# Patient Record
Sex: FEMALE | Race: ASIAN | NOT HISPANIC OR LATINO | Employment: OTHER | URBAN - METROPOLITAN AREA
[De-identification: names, ages, dates, MRNs, and addresses within clinical notes are randomized per-mention and may not be internally consistent; named-entity substitution may affect disease eponyms.]

---

## 2021-09-20 ENCOUNTER — HISTORICAL (OUTPATIENT)
Dept: LAB | Facility: HOSPITAL | Age: 81
End: 2021-09-20

## 2021-09-20 LAB
BUN SERPL-MCNC: 24.5 MG/DL (ref 9.8–20.1)
CALCIUM SERPL-MCNC: 9.3 MG/DL (ref 8.4–10.2)
CHLORIDE SERPL-SCNC: 107 MMOL/L (ref 98–107)
CO2 SERPL-SCNC: 26 MMOL/L (ref 23–31)
CREAT SERPL-MCNC: 1.12 MG/DL (ref 0.57–1.11)
CREAT/UREA NIT SERPL: 22
EST. AVERAGE GLUCOSE BLD GHB EST-MCNC: 128.4 MG/DL
GLUCOSE SERPL-MCNC: 149 MG/DL (ref 82–115)
HBA1C MFR BLD: 6.1 %
POTASSIUM SERPL-SCNC: 4.3 MMOL/L (ref 3.5–5.1)
SODIUM SERPL-SCNC: 143 MMOL/L (ref 136–145)

## 2021-09-28 ENCOUNTER — HISTORICAL (OUTPATIENT)
Dept: LAB | Facility: HOSPITAL | Age: 81
End: 2021-09-28

## 2021-09-28 LAB
BUN SERPL-MCNC: 26.5 MG/DL (ref 9.8–20.1)
CALCIUM SERPL-MCNC: 9.7 MG/DL (ref 8.4–10.2)
CHLORIDE SERPL-SCNC: 106 MMOL/L (ref 98–107)
CO2 SERPL-SCNC: 26 MMOL/L (ref 23–31)
CREAT SERPL-MCNC: 1.09 MG/DL (ref 0.57–1.11)
CREAT/UREA NIT SERPL: 24
GLUCOSE SERPL-MCNC: 123 MG/DL (ref 82–115)
POTASSIUM SERPL-SCNC: 4 MMOL/L (ref 3.5–5.1)
SODIUM SERPL-SCNC: 143 MMOL/L (ref 136–145)

## 2022-02-10 ENCOUNTER — HISTORICAL (OUTPATIENT)
Dept: LAB | Facility: HOSPITAL | Age: 82
End: 2022-02-10

## 2022-02-10 LAB
BUN SERPL-MCNC: 29.6 MG/DL (ref 9.8–20.1)
CALCIUM SERPL-MCNC: 9.1 MG/DL (ref 8.4–10.2)
CHLORIDE SERPL-SCNC: 108 MMOL/L (ref 98–107)
CHOLEST SERPL-MCNC: 158 MG/DL
CHOLEST/HDLC SERPL: 3 {RATIO} (ref 0–5)
CO2 SERPL-SCNC: 28 MMOL/L (ref 23–31)
CREAT SERPL-MCNC: 0.93 MG/DL (ref 0.57–1.11)
CREAT/UREA NIT SERPL: 32
EST. AVERAGE GLUCOSE BLD GHB EST-MCNC: 134.1 MG/DL
GLUCOSE SERPL-MCNC: 155 MG/DL (ref 82–115)
HBA1C MFR BLD: 6.3 %
HDLC SERPL-MCNC: 53 MG/DL (ref 40–60)
HEMOLYSIS INTERF INDEX SERPL-ACNC: 4
ICTERIC INTERF INDEX SERPL-ACNC: 0
LDLC SERPL CALC-MCNC: 89 MG/DL (ref 50–140)
LIPEMIC INTERF INDEX SERPL-ACNC: -3
POTASSIUM SERPL-SCNC: 4.5 MMOL/L (ref 3.5–5.1)
SODIUM SERPL-SCNC: 143 MMOL/L (ref 136–145)
TRIGL SERPL-MCNC: 82 MG/DL (ref 0–150)
VLDLC SERPL CALC-MCNC: 16 MG/DL

## 2022-03-07 ENCOUNTER — HISTORICAL (OUTPATIENT)
Dept: LAB | Facility: HOSPITAL | Age: 82
End: 2022-03-07

## 2022-03-07 LAB
EST. AVERAGE GLUCOSE BLD GHB EST-MCNC: 131.2 MG/DL
HBA1C MFR BLD: 6.2 %

## 2022-04-11 ENCOUNTER — HISTORICAL (OUTPATIENT)
Dept: ADMINISTRATIVE | Facility: HOSPITAL | Age: 82
End: 2022-04-11
Payer: MEDICARE

## 2022-04-24 VITALS
OXYGEN SATURATION: 98 % | HEIGHT: 56 IN | SYSTOLIC BLOOD PRESSURE: 130 MMHG | WEIGHT: 106.25 LBS | BODY MASS INDEX: 23.9 KG/M2 | DIASTOLIC BLOOD PRESSURE: 68 MMHG

## 2022-05-16 RX ORDER — DOXAZOSIN 4 MG/1
4 TABLET ORAL DAILY
COMMUNITY
Start: 2022-04-18 | End: 2022-05-16 | Stop reason: SDUPTHER

## 2022-05-16 RX ORDER — DOXAZOSIN 4 MG/1
4 TABLET ORAL DAILY
Qty: 90 TABLET | Refills: 0 | Status: SHIPPED | OUTPATIENT
Start: 2022-05-16 | End: 2022-08-10 | Stop reason: SDUPTHER

## 2022-05-16 NOTE — TELEPHONE ENCOUNTER
----- Message from Janae Brothers sent at 5/16/2022 10:25 AM CDT -----  Regarding: refill, Dr Philip  Patient daughter came in to request refill on patient for doxazosin.

## 2022-07-18 ENCOUNTER — TELEPHONE (OUTPATIENT)
Dept: FAMILY MEDICINE | Facility: CLINIC | Age: 82
End: 2022-07-18

## 2022-07-18 DIAGNOSIS — E11.9 NON-INSULIN DEPENDENT TYPE 2 DIABETES MELLITUS: Primary | ICD-10-CM

## 2022-07-18 DIAGNOSIS — I10 PRIMARY HYPERTENSION: ICD-10-CM

## 2022-07-19 NOTE — TELEPHONE ENCOUNTER
Pts family notified that the orders have been place for the labs and that pt can have them done prior to the next appt. Verbal understanding given

## 2022-07-19 NOTE — TELEPHONE ENCOUNTER
I have ordered the following labs. Please notify the patient.    Orders Placed This Encounter   Procedures    Hemoglobin A1C     Standing Status:   Future     Standing Expiration Date:   9/17/2023    Microalbumin/Creatinine Ratio, Urine     Standing Status:   Future     Standing Expiration Date:   9/17/2023     Order Specific Question:   Specimen Source     Answer:   Urine    Basic Metabolic Panel     Standing Status:   Future     Standing Expiration Date:   9/17/2023

## 2022-07-25 ENCOUNTER — LAB VISIT (OUTPATIENT)
Dept: LAB | Facility: HOSPITAL | Age: 82
End: 2022-07-25
Attending: STUDENT IN AN ORGANIZED HEALTH CARE EDUCATION/TRAINING PROGRAM
Payer: MEDICARE

## 2022-07-25 DIAGNOSIS — I10 PRIMARY HYPERTENSION: ICD-10-CM

## 2022-07-25 DIAGNOSIS — E11.9 NON-INSULIN DEPENDENT TYPE 2 DIABETES MELLITUS: ICD-10-CM

## 2022-07-25 LAB
ANION GAP SERPL CALC-SCNC: 10 MEQ/L
BUN SERPL-MCNC: 29.4 MG/DL (ref 9.8–20.1)
CALCIUM SERPL-MCNC: 9.9 MG/DL (ref 8.4–10.2)
CHLORIDE SERPL-SCNC: 108 MMOL/L (ref 98–107)
CO2 SERPL-SCNC: 24 MMOL/L (ref 23–31)
CREAT SERPL-MCNC: 1 MG/DL (ref 0.55–1.02)
CREAT UR-MCNC: 177.1 MG/DL (ref 47–110)
CREAT/UREA NIT SERPL: 29
EST. AVERAGE GLUCOSE BLD GHB EST-MCNC: 125.5 MG/DL
GLUCOSE SERPL-MCNC: 147 MG/DL (ref 82–115)
HBA1C MFR BLD: 6 %
MICROALBUMIN UR-MCNC: 521.8 UG/ML
MICROALBUMIN/CREAT RATIO PNL UR: 294.6 MG/GM CR (ref 0–30)
POTASSIUM SERPL-SCNC: 4.1 MMOL/L (ref 3.5–5.1)
SODIUM SERPL-SCNC: 142 MMOL/L (ref 136–145)

## 2022-07-25 PROCEDURE — 36415 COLL VENOUS BLD VENIPUNCTURE: CPT

## 2022-07-25 PROCEDURE — 82043 UR ALBUMIN QUANTITATIVE: CPT

## 2022-07-25 PROCEDURE — 80048 BASIC METABOLIC PNL TOTAL CA: CPT

## 2022-07-25 PROCEDURE — 83036 HEMOGLOBIN GLYCOSYLATED A1C: CPT

## 2022-07-26 ENCOUNTER — TELEPHONE (OUTPATIENT)
Dept: FAMILY MEDICINE | Facility: CLINIC | Age: 82
End: 2022-07-26
Payer: MEDICARE

## 2022-07-26 NOTE — TELEPHONE ENCOUNTER
----- Message from GISSEL Monzon sent at 7/25/2022  4:13 PM CDT -----  Labs reviewed; No acute/urgent findings; will be discussed at upcoming appt with Dr. Philip

## 2022-08-03 ENCOUNTER — HOSPITAL ENCOUNTER (EMERGENCY)
Facility: HOSPITAL | Age: 82
Discharge: HOME | End: 2022-08-03
Attending: INTERNAL MEDICINE
Payer: MEDICARE

## 2022-08-03 VITALS
SYSTOLIC BLOOD PRESSURE: 159 MMHG | RESPIRATION RATE: 16 BRPM | HEIGHT: 58 IN | BODY MASS INDEX: 23.09 KG/M2 | WEIGHT: 110 LBS | OXYGEN SATURATION: 97 % | HEART RATE: 56 BPM | TEMPERATURE: 98 F | DIASTOLIC BLOOD PRESSURE: 72 MMHG

## 2022-08-03 DIAGNOSIS — L30.9 DERMATITIS: ICD-10-CM

## 2022-08-03 DIAGNOSIS — R07.9 CHEST PAIN: ICD-10-CM

## 2022-08-03 DIAGNOSIS — R04.2 COUGH WITH HEMOPTYSIS: Primary | ICD-10-CM

## 2022-08-03 DIAGNOSIS — D64.9 ANEMIA, UNSPECIFIED TYPE: ICD-10-CM

## 2022-08-03 LAB
ALBUMIN SERPL-MCNC: 3.9 GM/DL (ref 3.4–4.8)
ALBUMIN/GLOB SERPL: 1.2 RATIO (ref 1.1–2)
ALP SERPL-CCNC: 74 UNIT/L (ref 40–150)
ALT SERPL-CCNC: 21 UNIT/L (ref 0–55)
APTT PPP: 27.8 SECONDS (ref 23.4–33.9)
AST SERPL-CCNC: 22 UNIT/L (ref 5–34)
BASOPHILS # BLD AUTO: 0.03 X10(3)/MCL (ref 0–0.2)
BASOPHILS NFR BLD AUTO: 0.8 %
BILIRUBIN DIRECT+TOT PNL SERPL-MCNC: 0.5 MG/DL
BNP BLD-MCNC: 75.8 PG/ML
BUN SERPL-MCNC: 23.5 MG/DL (ref 9.8–20.1)
CALCIUM SERPL-MCNC: 9.5 MG/DL (ref 8.4–10.2)
CHLORIDE SERPL-SCNC: 110 MMOL/L (ref 98–107)
CO2 SERPL-SCNC: 26 MMOL/L (ref 23–31)
CREAT SERPL-MCNC: 0.98 MG/DL (ref 0.55–1.02)
EOSINOPHIL # BLD AUTO: 0.32 X10(3)/MCL (ref 0–0.9)
EOSINOPHIL NFR BLD AUTO: 8.2 %
ERYTHROCYTE [DISTWIDTH] IN BLOOD BY AUTOMATED COUNT: 13.4 % (ref 11.5–17)
GLOBULIN SER-MCNC: 3.3 GM/DL (ref 2.4–3.5)
GLUCOSE SERPL-MCNC: 154 MG/DL (ref 82–115)
HCT VFR BLD AUTO: 33.2 % (ref 37–47)
HGB BLD-MCNC: 11 GM/DL (ref 12–16)
IMM GRANULOCYTES # BLD AUTO: 0 X10(3)/MCL (ref 0–0.04)
IMM GRANULOCYTES NFR BLD AUTO: 0 %
INR BLD: 1.06 (ref 2–3)
LYMPHOCYTES # BLD AUTO: 1.03 X10(3)/MCL (ref 0.6–4.6)
LYMPHOCYTES NFR BLD AUTO: 26.5 %
MCH RBC QN AUTO: 31.1 PG (ref 27–31)
MCHC RBC AUTO-ENTMCNC: 33.1 MG/DL (ref 33–36)
MCV RBC AUTO: 93.8 FL (ref 80–94)
MONOCYTES # BLD AUTO: 0.27 X10(3)/MCL (ref 0.1–1.3)
MONOCYTES NFR BLD AUTO: 7 %
NEUTROPHILS # BLD AUTO: 2.2 X10(3)/MCL (ref 2.1–9.2)
NEUTROPHILS NFR BLD AUTO: 57.5 %
NRBC BLD AUTO-RTO: 0 %
PLATELET # BLD AUTO: 193 X10(3)/MCL (ref 130–400)
PMV BLD AUTO: 9.9 FL (ref 7.4–10.4)
POTASSIUM SERPL-SCNC: 4 MMOL/L (ref 3.5–5.1)
PROT SERPL-MCNC: 7.2 GM/DL (ref 5.8–7.6)
PROTHROMBIN TIME: 13.6 SECONDS (ref 11.7–14.5)
RBC # BLD AUTO: 3.54 X10(6)/MCL (ref 4.2–5.4)
SARS-COV-2 RDRP RESP QL NAA+PROBE: NEGATIVE
SODIUM SERPL-SCNC: 146 MMOL/L (ref 136–145)
TROPONIN I SERPL-MCNC: <0.01 NG/ML (ref 0–0.04)
WBC # SPEC AUTO: 3.9 X10(3)/MCL (ref 4.5–11.5)

## 2022-08-03 PROCEDURE — 63600175 PHARM REV CODE 636 W HCPCS: Performed by: INTERNAL MEDICINE

## 2022-08-03 PROCEDURE — 93010 ELECTROCARDIOGRAM REPORT: CPT | Mod: ,,, | Performed by: INTERNAL MEDICINE

## 2022-08-03 PROCEDURE — 96374 THER/PROPH/DIAG INJ IV PUSH: CPT | Mod: 59

## 2022-08-03 PROCEDURE — 96361 HYDRATE IV INFUSION ADD-ON: CPT

## 2022-08-03 PROCEDURE — 85025 COMPLETE CBC W/AUTO DIFF WBC: CPT | Performed by: INTERNAL MEDICINE

## 2022-08-03 PROCEDURE — 25000003 PHARM REV CODE 250: Performed by: INTERNAL MEDICINE

## 2022-08-03 PROCEDURE — 93005 ELECTROCARDIOGRAM TRACING: CPT

## 2022-08-03 PROCEDURE — 85730 THROMBOPLASTIN TIME PARTIAL: CPT | Performed by: INTERNAL MEDICINE

## 2022-08-03 PROCEDURE — 84484 ASSAY OF TROPONIN QUANT: CPT | Performed by: INTERNAL MEDICINE

## 2022-08-03 PROCEDURE — 80053 COMPREHEN METABOLIC PANEL: CPT | Performed by: INTERNAL MEDICINE

## 2022-08-03 PROCEDURE — 83880 ASSAY OF NATRIURETIC PEPTIDE: CPT | Performed by: INTERNAL MEDICINE

## 2022-08-03 PROCEDURE — 36415 COLL VENOUS BLD VENIPUNCTURE: CPT | Performed by: INTERNAL MEDICINE

## 2022-08-03 PROCEDURE — 93010 EKG 12-LEAD: ICD-10-PCS | Mod: ,,, | Performed by: INTERNAL MEDICINE

## 2022-08-03 PROCEDURE — 85610 PROTHROMBIN TIME: CPT | Performed by: INTERNAL MEDICINE

## 2022-08-03 PROCEDURE — 25500020 PHARM REV CODE 255: Performed by: INTERNAL MEDICINE

## 2022-08-03 PROCEDURE — 99285 EMERGENCY DEPT VISIT HI MDM: CPT | Mod: 25

## 2022-08-03 PROCEDURE — 96375 TX/PRO/DX INJ NEW DRUG ADDON: CPT

## 2022-08-03 PROCEDURE — 87635 SARS-COV-2 COVID-19 AMP PRB: CPT | Performed by: INTERNAL MEDICINE

## 2022-08-03 RX ORDER — METHYLPREDNISOLONE SOD SUCC 125 MG
80 VIAL (EA) INJECTION ONCE
Status: COMPLETED | OUTPATIENT
Start: 2022-08-03 | End: 2022-08-03

## 2022-08-03 RX ORDER — GUAIFENESIN/DEXTROMETHORPHAN 100-10MG/5
5 SYRUP ORAL EVERY 6 HOURS PRN
Qty: 180 ML | Refills: 0 | Status: SHIPPED | OUTPATIENT
Start: 2022-08-03 | End: 2022-11-14

## 2022-08-03 RX ORDER — SUCRALFATE 1 G/1
1 TABLET ORAL ONCE
Status: COMPLETED | OUTPATIENT
Start: 2022-08-03 | End: 2022-08-03

## 2022-08-03 RX ORDER — PANTOPRAZOLE SODIUM 40 MG/1
40 TABLET, DELAYED RELEASE ORAL 2 TIMES DAILY
Qty: 20 TABLET | Refills: 0 | Status: SHIPPED | OUTPATIENT
Start: 2022-08-03 | End: 2022-08-10 | Stop reason: ALTCHOICE

## 2022-08-03 RX ORDER — SUCRALFATE 1 G/1
1 TABLET ORAL
Qty: 40 TABLET | Refills: 0 | Status: SHIPPED | OUTPATIENT
Start: 2022-08-03 | End: 2022-08-10 | Stop reason: ALTCHOICE

## 2022-08-03 RX ORDER — TRIAMCINOLONE ACETONIDE 1 MG/G
OINTMENT TOPICAL 3 TIMES DAILY
Qty: 80 G | Refills: 0 | Status: SHIPPED | OUTPATIENT
Start: 2022-08-03 | End: 2022-09-19 | Stop reason: SDUPTHER

## 2022-08-03 RX ORDER — PREDNISONE 20 MG/1
20 TABLET ORAL DAILY
Qty: 5 TABLET | Refills: 0 | Status: SHIPPED | OUTPATIENT
Start: 2022-08-03 | End: 2022-08-08

## 2022-08-03 RX ORDER — ONDANSETRON 2 MG/ML
4 INJECTION INTRAMUSCULAR; INTRAVENOUS ONCE
Status: COMPLETED | OUTPATIENT
Start: 2022-08-03 | End: 2022-08-03

## 2022-08-03 RX ORDER — ALBUTEROL SULFATE 90 UG/1
2 AEROSOL, METERED RESPIRATORY (INHALATION) EVERY 6 HOURS PRN
Qty: 18 G | Refills: 0 | Status: SHIPPED | OUTPATIENT
Start: 2022-08-03

## 2022-08-03 RX ADMIN — SUCRALFATE 1 G: 1 TABLET ORAL at 10:08

## 2022-08-03 RX ADMIN — ONDANSETRON 4 MG: 2 INJECTION INTRAMUSCULAR; INTRAVENOUS at 10:08

## 2022-08-03 RX ADMIN — IOPAMIDOL 100 ML: 755 INJECTION, SOLUTION INTRAVENOUS at 10:08

## 2022-08-03 RX ADMIN — METHYLPREDNISOLONE SODIUM SUCCINATE 80 MG: 125 INJECTION, POWDER, FOR SOLUTION INTRAMUSCULAR; INTRAVENOUS at 10:08

## 2022-08-03 RX ADMIN — SODIUM CHLORIDE, POTASSIUM CHLORIDE, SODIUM LACTATE AND CALCIUM CHLORIDE 1000 ML: 600; 310; 30; 20 INJECTION, SOLUTION INTRAVENOUS at 10:08

## 2022-08-03 NOTE — ED PROVIDER NOTES
Source of History:  Patient and nephew, moderate limitations as Japanese speaking only as well as difficulty hearing    Chief complaint:  Hemoptysis (Grandson states pt began coughing up blood on sunday as well as chestpain and chills.)      HPI:  Shilpa Dumont is a 82 y.o. female presenting with Hemoptysis (Grandson states pt began coughing up blood on sunday as well as chestpain and chills.)       Moderately difficult HPI due to hearing issues as well as language barrier, complains of vague complaints chest discomfort possibly related to an itchy rash on anterior chest that has been present for years, single time minor hemoptysis several days ago, epigastric pain, denies weight loss, denies appetite loss, appears resting comfortably, lungs sound clear the her right  The patient complains of chills, dyspnea and productive cough with sputum described as bloody and cassie. Onset of symptoms was abrupt starting 2 days ago. Symptom course has been intermittent, while severity at onset was mild and now is mild. Symptoms tend to occur while at rest. Symptoms are aggravated by nothing, alleviated by nothing and have been associated with nothing. Past respiratory history includes none.    Review of Systems   Constitutional symptoms:  Negative except as documented in HPI.   Skin symptoms:  Negative except as documented in HPI.   HEENT symptoms:  Negative except as documented in HPI.   Respiratory symptoms:  Negative except as documented in HPI.   Cardiovascular symptoms:  Negative except as documented in HPI.   Gastrointestinal symptoms:  Negative except as documented in HPI.    Genitourinary symptoms:  Negative except as documented in HPI.   Musculoskeletal symptoms:  Negative except as documented in HPI.   Neurologic symptoms:  Negative except as documented in HPI.   Psychiatric symptoms:  Negative except as documented in HPI.   Allergy/immunologic symptoms:  Negative except as documented in HPI.              "Additional review of systems information: All other systems reviewed and otherwise negative.      Review of patient's allergies indicates:  Not on File    PMH:  As per HPI and below:    No past medical history on file.     No family history on file.    No past surgical history on file.         There is no problem list on file for this patient.       Physical Exam:    BP (!) 159/72   Pulse (!) 56   Temp 97.5 °F (36.4 °C) (Temporal)   Resp 16   Ht 4' 10" (1.473 m)   Wt 49.9 kg (110 lb)   SpO2 97%   Breastfeeding No   BMI 22.99 kg/m²     Nursing note and vital signs reviewed.    General:  Alert, no acute distress.   Skin: Normal for Ethnic Origin, No cyanosis, rash to anterior chest and neck, crusting, linear, excoriated   HEENT: Normocephalic and atraumatic, Vision unchanged, Pupils symmetric, No icterus , Nasal mucosa is pink and moist  Cardiovascular:  Regular rate and rhythm, No edema  Chest Wall: No deformity, equal chest rise  Respiratory:  Lungs are clear to auscultation, respirations are non-labored.    Musculoskeletal:  No deformity, Normal perfusion to all extremities  Back: No bony tenderness  : No suprapubic pain, No CVA tenderness  Gastrointestinal:  Soft, epigastric tenderness, Non distended, Normal bowel sounds.            Labs that have been ordered have been independently reviewed and interpreted by myself.     Old Chart Reviewed.      Initial Impression/ Differential Dx:  Viral upper respiratory infection, sinusitis, allergic rhinitis, bronchitis, influenza, pneumonia, dental infection, pharyngitis       MDM:      Reviewed Nurses Note.    Reviewed Pertinent old records.    Orders Placed This Encounter    Pulse Oximeter    X-Ray Chest 1 View    CT Abdomen Pelvis With Contrast    CBC Auto Differential    Comprehensive Metabolic Panel    Protime-INR    APTT    Troponin I    BNP    COVID-19 Rapid Screening    CBC with Differential    Cardiac Monitoring - Adult    EKG 12-lead    " Insert peripheral IV    lactated ringers bolus 1,000 mL    ondansetron injection 4 mg    iopamidoL (ISOVUE-370) injection 100 mL    methylPREDNISolone sodium succinate injection 80 mg    sucralfate tablet 1 g    triamcinolone acetonide 0.1% (KENALOG) 0.1 % ointment    predniSONE (DELTASONE) 20 MG tablet    albuterol (VENTOLIN HFA) 90 mcg/actuation inhaler    dextromethorphan-guaiFENesin  mg/5 ml (ROBITUSSIN-DM)  mg/5 mL liquid    sucralfate (CARAFATE) 1 gram tablet    pantoprazole (PROTONIX) 40 MG tablet                    Labs Reviewed   COMPREHENSIVE METABOLIC PANEL - Abnormal; Notable for the following components:       Result Value    Sodium Level 146 (*)     Chloride 110 (*)     Glucose Level 154 (*)     Blood Urea Nitrogen 23.5 (*)     All other components within normal limits   PROTIME-INR - Abnormal; Notable for the following components:    INR 1.06 (*)     All other components within normal limits   CBC WITH DIFFERENTIAL - Abnormal; Notable for the following components:    WBC 3.9 (*)     RBC 3.54 (*)     Hgb 11.0 (*)     Hct 33.2 (*)     MCH 31.1 (*)     All other components within normal limits   APTT - Normal   TROPONIN I - Normal   B-TYPE NATRIURETIC PEPTIDE - Normal   SARS-COV-2 RNA AMPLIFICATION, QUAL - Normal   CBC W/ AUTO DIFFERENTIAL    Narrative:     The following orders were created for panel order CBC Auto Differential.  Procedure                               Abnormality         Status                     ---------                               -----------         ------                     CBC with Differential[[id number]]        Abnormal            Final result                 Please view results for these tests on the individual orders.          CT Abdomen Pelvis With Contrast   Final Result      1. No appreciable acute intra-abdominal abnormality   2. Cardiomegaly         Electronically signed by: Martha Rodriguez   Date:    08/03/2022   Time:    10:22      X-Ray Chest  1 View   Final Result      No acute chest disease is identified.      Cardiomegaly         Electronically signed by: Kendall Lea   Date:    08/03/2022   Time:    09:26           Admission on 08/03/2022   Component Date Value Ref Range Status    Sodium Level 08/03/2022 146 (A) 136 - 145 mmol/L Final    Potassium Level 08/03/2022 4.0  3.5 - 5.1 mmol/L Final    Chloride 08/03/2022 110 (A) 98 - 107 mmol/L Final    Carbon Dioxide 08/03/2022 26  23 - 31 mmol/L Final    Glucose Level 08/03/2022 154 (A) 82 - 115 mg/dL Final    Blood Urea Nitrogen 08/03/2022 23.5 (A) 9.8 - 20.1 mg/dL Final    Creatinine 08/03/2022 0.98  0.55 - 1.02 mg/dL Final    Calcium Level Total 08/03/2022 9.5  8.4 - 10.2 mg/dL Final    Protein Total 08/03/2022 7.2  5.8 - 7.6 gm/dL Final    Albumin Level 08/03/2022 3.9  3.4 - 4.8 gm/dL Final    Globulin 08/03/2022 3.3  2.4 - 3.5 gm/dL Final    Albumin/Globulin Ratio 08/03/2022 1.2  1.1 - 2.0 ratio Final    Bilirubin Total 08/03/2022 0.5  <=1.5 mg/dL Final    Alkaline Phosphatase 08/03/2022 74  40 - 150 unit/L Final    Alanine Aminotransferase 08/03/2022 21  0 - 55 unit/L Final    Aspartate Aminotransferase 08/03/2022 22  5 - 34 unit/L Final    Estimated GFR-Non  08/03/2022 58  mls/min/1.73/m2 Final    PT 08/03/2022 13.6  11.7 - 14.5 seconds Final    INR 08/03/2022 1.06 (A) 2.00 - 3.00 Final    PTT 08/03/2022 27.8  23.4 - 33.9 seconds Final    Troponin-I 08/03/2022 <0.010  0.000 - 0.045 ng/mL Final    Natriuretic Peptide 08/03/2022 75.8  <=100.0 pg/mL Final    SARS COV-2 MOLECULAR 08/03/2022 Negative  Negative Final    WBC 08/03/2022 3.9 (A) 4.5 - 11.5 x10(3)/mcL Final    RBC 08/03/2022 3.54 (A) 4.20 - 5.40 x10(6)/mcL Final    Hgb 08/03/2022 11.0 (A) 12.0 - 16.0 gm/dL Final    Hct 08/03/2022 33.2 (A) 37.0 - 47.0 % Final    MCV 08/03/2022 93.8  80.0 - 94.0 fL Final    MCH 08/03/2022 31.1 (A) 27.0 - 31.0 pg Final    MCHC 08/03/2022 33.1  33.0 - 36.0 mg/dL  Final    RDW 08/03/2022 13.4  11.5 - 17.0 % Final    Platelet 08/03/2022 193  130 - 400 x10(3)/mcL Final    MPV 08/03/2022 9.9  7.4 - 10.4 fL Final    Neut % 08/03/2022 57.5  % Final    Lymph % 08/03/2022 26.5  % Final    Mono % 08/03/2022 7.0  % Final    Eos % 08/03/2022 8.2  % Final    Basophil % 08/03/2022 0.8  % Final    Lymph # 08/03/2022 1.03  0.6 - 4.6 x10(3)/mcL Final    Neut # 08/03/2022 2.2  2.1 - 9.2 x10(3)/mcL Final    Mono # 08/03/2022 0.27  0.1 - 1.3 x10(3)/mcL Final    Eos # 08/03/2022 0.32  0 - 0.9 x10(3)/mcL Final    Baso # 08/03/2022 0.03  0 - 0.2 x10(3)/mcL Final    IG# 08/03/2022 0.00  0 - 0.04 x10(3)/mcL Final    IG% 08/03/2022 0.0  % Final    NRBC% 08/03/2022 0.0  % Final       Imaging Results          CT Abdomen Pelvis With Contrast (Final result)  Result time 08/03/22 10:22:16    Final result by Martha Rodriguez MD (08/03/22 10:22:16)                 Impression:      1. No appreciable acute intra-abdominal abnormality  2. Cardiomegaly      Electronically signed by: Martha Rodriguez  Date:    08/03/2022  Time:    10:22             Narrative:    EXAMINATION:  CT ABDOMEN PELVIS WITH CONTRAST    CLINICAL HISTORY:  Abdominal abscess/infection suspected;    TECHNIQUE:  Helically acquired images with axial, sagittal and coronal reformations were obtained from the lung bases to the pubic symphysis after the IV administration of contrast.    Automated tube current modulation, weight-based exposure dosing, and/or iterative reconstruction technique utilized to reach lowest reasonably achievable exposure rate.    DLP: 166 mGy*cm    COMPARISON:  Report from CT abdomen pelvis dated 12/01/2021    FINDINGS:  LIMITATIONS: Motion degraded exam.    HEART: Cardiomegaly.    LUNG BASES: Trace pleural fluid bilaterally.    LIVER: Normal attenuation. No appreciable focal hepatic lesion.    BILIARY: No calcified gallstones.    PANCREAS: No inflammatory change.    SPLEEN: Normal in  size    ADRENALS: No mass.    KIDNEYS/URETERS: The kidneys enhance symmetrically.  No hydronephrosis.    GI TRACT/MESENTERY: Stomach has a normal CT appearance.  No evidence of bowel obstruction or inflammation. The appendix is normal.    PERITONEUM: No free fluid.No free air.    LYMPH NODES: No enlarged lymph nodes by size criteria.    VASCULATURE: Aortoiliac atherosclerosis.    BLADDER: Normal appearance given degree of distention.    REPRODUCTIVE ORGANS: Normal as visualized.    SOFT TISSUES: Unremarkable.    BONES: Lumbar spondylosis.                               X-Ray Chest 1 View (Final result)  Result time 08/03/22 09:26:02    Final result by Kendall Lea MD (08/03/22 09:26:02)                 Impression:      No acute chest disease is identified.    Cardiomegaly      Electronically signed by: Kendall Lea  Date:    08/03/2022  Time:    09:26             Narrative:    EXAMINATION:  XR CHEST 1 VIEW    CLINICAL HISTORY:  cough;, .    FINDINGS:  No alveolar consolidation, effusion, or pneumothorax is seen.   The thoracic aorta is normal  cardiac silhouette is enlarged, central pulmonary vessels and mediastinum are normal in size and are grossly unremarkable.   visualized osseous structures are grossly unremarkable.                                  ECG Results          EKG 12-lead (Preliminary result)  Result time 08/03/22 09:07:42    ED Interpretation by Elias Hager DO (08/03/22 09:07:42, [facility] - Emergency Dept, Emergency Medicine)    Independent ECG Interpretation:    NSR with short PRN at rate of 75. Normal intervals.  Rightward axis. Normal QRS. Nonspecific ST or T wave abnormalities. Overall impression: Abnormal                                                                Diagnostic Impression:    1. Cough with hemoptysis    2. Chest pain    3. Dermatitis    4. Anemia, unspecified type         ED Disposition Condition    Discharge Stable           Follow-up  Information     [facility] - Emergency Dept.    Specialty: Emergency Medicine  Why: If symptoms worsen  Contact information:  [address]  [facility] [address] [phone]           Call  MELISSA PHILIP DO.    Specialty: Family Medicine  Contact information:  [address] [address]  [phone]                          ED Prescriptions     Medication Sig Dispense Start Date End Date Auth. Provider    triamcinolone acetonide 0.1% (KENALOG) 0.1 % ointment Apply topically 3 (three) times daily. 80 g 8/3/2022  Elias Hager, DO    predniSONE (DELTASONE) 20 MG tablet Take 1 tablet (20 mg total) by mouth once daily. for 5 days 5 tablet 8/3/2022 8/8/2022 Elias Hager, DO    albuterol (VENTOLIN HFA) 90 mcg/actuation inhaler Inhale 2 puffs into the lungs every 6 (six) hours as needed for Wheezing. Rescue 18 g 8/3/2022  Elias Hager, DO    dextromethorphan-guaiFENesin  mg/5 ml (ROBITUSSIN-DM)  mg/5 mL liquid Take 5 mLs by mouth every 6 (six) hours as needed (cough). 180 mL 8/3/2022  Elias Hager, DO    sucralfate (CARAFATE) 1 gram tablet Take 1 tablet (1 g total) by mouth 4 (four) times daily before meals and nightly. for 10 days 40 tablet 8/3/2022 8/13/2022 Elias Hager, DO    pantoprazole (PROTONIX) 40 MG tablet Take 1 tablet (40 mg total) by mouth 2 (two) times daily. for 10 days 20 tablet 8/3/2022 8/13/2022 Elias Hager DO        Follow-up Information     Follow up With Specialties Details Why Contact Info    [facility] - Emergency Dept Emergency Medicine  If symptoms worsen [address]  [facility] [id number] [phone]    Melissa Philip DO Family Medicine Call   [address] [address]  [phone]             Elias Hager,   08/03/22 1105

## 2022-08-10 ENCOUNTER — OFFICE VISIT (OUTPATIENT)
Dept: FAMILY MEDICINE | Facility: CLINIC | Age: 82
End: 2022-08-10
Payer: MEDICARE

## 2022-08-10 VITALS
OXYGEN SATURATION: 98 % | BODY MASS INDEX: 19.26 KG/M2 | HEART RATE: 87 BPM | TEMPERATURE: 98 F | WEIGHT: 102 LBS | SYSTOLIC BLOOD PRESSURE: 128 MMHG | DIASTOLIC BLOOD PRESSURE: 74 MMHG | HEIGHT: 61 IN | RESPIRATION RATE: 12 BRPM

## 2022-08-10 DIAGNOSIS — L30.9 DERMATITIS: ICD-10-CM

## 2022-08-10 DIAGNOSIS — E11.65 TYPE 2 DIABETES MELLITUS WITH HYPERGLYCEMIA, WITHOUT LONG-TERM CURRENT USE OF INSULIN: Primary | ICD-10-CM

## 2022-08-10 DIAGNOSIS — I10 PRIMARY HYPERTENSION: ICD-10-CM

## 2022-08-10 DIAGNOSIS — E78.2 MIXED HYPERLIPIDEMIA: ICD-10-CM

## 2022-08-10 PROBLEM — E78.5 HYPERLIPIDEMIA: Status: ACTIVE | Noted: 2022-08-10

## 2022-08-10 PROBLEM — L98.9 INFLAMMATORY DERMATOSIS: Status: ACTIVE | Noted: 2022-08-10

## 2022-08-10 PROBLEM — N95.2 ATROPHIC VAGINITIS: Status: ACTIVE | Noted: 2022-08-10

## 2022-08-10 PROBLEM — E11.9 TYPE 2 DIABETES MELLITUS: Status: ACTIVE | Noted: 2022-08-10

## 2022-08-10 PROCEDURE — 99214 PR OFFICE/OUTPT VISIT, EST, LEVL IV, 30-39 MIN: ICD-10-PCS | Mod: ,,, | Performed by: STUDENT IN AN ORGANIZED HEALTH CARE EDUCATION/TRAINING PROGRAM

## 2022-08-10 PROCEDURE — 99214 OFFICE O/P EST MOD 30 MIN: CPT | Mod: ,,, | Performed by: STUDENT IN AN ORGANIZED HEALTH CARE EDUCATION/TRAINING PROGRAM

## 2022-08-10 RX ORDER — GLIPIZIDE 5 MG/1
5 TABLET ORAL DAILY
COMMUNITY
End: 2022-12-27 | Stop reason: SDUPTHER

## 2022-08-10 RX ORDER — AMLODIPINE BESYLATE 10 MG/1
10 TABLET ORAL
COMMUNITY
Start: 2021-07-07 | End: 2022-11-29 | Stop reason: SDUPTHER

## 2022-08-10 RX ORDER — DOXAZOSIN 4 MG/1
4 TABLET ORAL DAILY
Qty: 90 TABLET | Refills: 0 | Status: SHIPPED | OUTPATIENT
Start: 2022-08-10 | End: 2022-12-02 | Stop reason: SDUPTHER

## 2022-08-10 RX ORDER — IRBESARTAN 300 MG/1
300 TABLET ORAL
COMMUNITY
Start: 2021-07-07 | End: 2022-12-27 | Stop reason: SDUPTHER

## 2022-08-10 RX ORDER — ROSUVASTATIN CALCIUM 20 MG/1
20 TABLET, COATED ORAL DAILY
COMMUNITY
Start: 2022-07-24 | End: 2022-11-14 | Stop reason: SDUPTHER

## 2022-08-10 NOTE — ASSESSMENT & PLAN NOTE
- A1c 6.0 from 07/25/22.  - Will need urine microalbumin/Cr ratio at follow-up.  - Patient should have follow-up with Ophthalmology.  - Will need diabetic foot exam at follow-up.  - Continue Glipizide 5mg daily.

## 2022-08-10 NOTE — ASSESSMENT & PLAN NOTE
- Continue Triamcinolone PRN.  - Patient's son-in-law states she has an upcoming appointment with Dermatology in September.

## 2022-08-10 NOTE — PROGRESS NOTES
"Subjective:      Patient ID: Shilpa Dumont is a 82 y.o. [place] female. She is accompanied her , Mr. Syed Ball and her son-in-law who also serves as her .    NIDDMII:     Lab Results   Component Value Date    HGBA1C 6.0 07/25/2022     Patient taking Glipizide daily. She denies any hypoglycemic episodes.    HTN/HLD: /74. Patient states compliance Norvasc, Doxazosin, Irbesartan, and Crestor.    Dermatitis: Patient using triamcinolone ointment PRN. Patient's son-in-law states she has an upcoming appointment with Dermatology in September.    Review of Systems   Constitutional: Negative for activity change.   Eyes: Negative for visual disturbance.   Respiratory: Negative for apnea, cough and shortness of breath.    Cardiovascular: Negative for chest pain and leg swelling.   Gastrointestinal: Negative for abdominal pain, diarrhea and nausea.   Musculoskeletal: Negative for arthralgias, back pain and myalgias.   Skin: Positive for rash. Negative for wound.   Neurological: Negative for dizziness, weakness, numbness and headaches.   Psychiatric/Behavioral: Negative for behavioral problems.     Objective:   /74 (BP Location: Left arm, Patient Position: Sitting, BP Method: Small (Automatic))   Pulse 87   Temp 98.4 °F (36.9 °C) (Temporal)   Resp 12   Ht 5' 1" (1.549 m)   Wt 46.3 kg (102 lb)   SpO2 98%   BMI 19.27 kg/m²     Physical Exam  Vitals and nursing note reviewed.   Constitutional:       General: She is not in acute distress.     Appearance: Normal appearance. She is not ill-appearing or toxic-appearing.   HENT:      Head: Normocephalic and atraumatic.   Cardiovascular:      Rate and Rhythm: Normal rate and regular rhythm.      Heart sounds: Normal heart sounds. No murmur heard.  Pulmonary:      Effort: Pulmonary effort is normal. No respiratory distress.      Breath sounds: Normal breath sounds.   Abdominal:      General: There is no distension.      Palpations: Abdomen is soft.      " Tenderness: There is no abdominal tenderness.   Musculoskeletal:         General: Normal range of motion.      Right lower leg: No edema.      Left lower leg: No edema.   Skin:     General: Skin is warm and dry.      Findings: No lesion or rash.   Neurological:      General: No focal deficit present.      Mental Status: She is alert. Mental status is at baseline.   Psychiatric:         Mood and Affect: Mood normal.         Behavior: Behavior normal.         Thought Content: Thought content normal.         Judgment: Judgment normal.       Assessment:     1. Type 2 diabetes mellitus with hyperglycemia, without long-term current use of insulin    2. Primary hypertension    3. Mixed hyperlipidemia    4. Dermatitis      Plan:     Problem List Items Addressed This Visit        Derm    Dermatitis     - Continue Triamcinolone PRN.  - Patient's son-in-law states she has an upcoming appointment with Dermatology in September.              Cardiac/Vascular    Hypertension     - /74, well-controlled.  - Continue Norvasc 10mg daily. Irbesartan 300mg daily, and Doxazosin 4mg daily.             Relevant Medications    doxazosin (CARDURA) 4 MG tablet    Hyperlipidemia     - Continue Crestor 20mg daily.              Endocrine    Type 2 diabetes mellitus - Primary     - A1c 6.0 from 07/25/22.  - Will need urine microalbumin/Cr ratio at follow-up.  - Patient should have follow-up with Ophthalmology.  - Will need diabetic foot exam at follow-up.  - Continue Glipizide 5mg daily.           Relevant Medications    glipiZIDE (GLUCOTROL) 5 MG tablet

## 2022-08-10 NOTE — ASSESSMENT & PLAN NOTE
- /74, well-controlled.  - Continue Norvasc 10mg daily. Irbesartan 300mg daily, and Doxazosin 4mg daily.

## 2022-09-19 RX ORDER — TRIAMCINOLONE ACETONIDE 1 MG/G
OINTMENT TOPICAL 3 TIMES DAILY
Qty: 80 G | Refills: 3 | Status: SHIPPED | OUTPATIENT
Start: 2022-09-19

## 2022-09-19 NOTE — TELEPHONE ENCOUNTER
----- Message from Dasha Pacheco sent at 9/19/2022  9:32 AM CDT -----  Regarding: refill  Type:  RX Refill Request    Who Called: pt daughter  Refill or New Rx:refill  RX Name and Strength:triamcinolone acetonide 0.1% (KENALOG) 0.1 % ointment  How is the patient currently taking it? (ex. 1XDay):  Is this a 30 day or 90 day RX:  Preferred Pharm[facility] with phone number:[facility]  Local or Mail Order:local  Ordering Provider:howie martinez  Would the patient rather a call back or a response via [facility]? C/b  Best Call Back Number:[phone]  Additional Information:

## 2022-09-27 ENCOUNTER — TELEPHONE (OUTPATIENT)
Dept: FAMILY MEDICINE | Facility: CLINIC | Age: 82
End: 2022-09-27
Payer: MEDICARE

## 2022-09-27 NOTE — TELEPHONE ENCOUNTER
"Spoke to pts daughter. She states that pt did see dermatology but that "it did not work for her."  She is asking for Dr Philip to prescribe the creams. Please advise  "

## 2022-09-28 NOTE — TELEPHONE ENCOUNTER
Pt's daughter notified that she would have to reach out to dermatology. Verbal understanding given

## 2022-11-14 ENCOUNTER — OFFICE VISIT (OUTPATIENT)
Dept: FAMILY MEDICINE | Facility: CLINIC | Age: 82
End: 2022-11-14
Payer: MEDICARE

## 2022-11-14 VITALS
HEART RATE: 83 BPM | OXYGEN SATURATION: 97 % | BODY MASS INDEX: 17.81 KG/M2 | RESPIRATION RATE: 18 BRPM | WEIGHT: 94.31 LBS | TEMPERATURE: 98 F | DIASTOLIC BLOOD PRESSURE: 69 MMHG | SYSTOLIC BLOOD PRESSURE: 110 MMHG | HEIGHT: 61 IN

## 2022-11-14 DIAGNOSIS — Z00.00 MEDICARE ANNUAL WELLNESS VISIT, SUBSEQUENT: Primary | ICD-10-CM

## 2022-11-14 DIAGNOSIS — I10 PRIMARY HYPERTENSION: ICD-10-CM

## 2022-11-14 DIAGNOSIS — L30.9 DERMATITIS: ICD-10-CM

## 2022-11-14 DIAGNOSIS — E11.65 TYPE 2 DIABETES MELLITUS WITH HYPERGLYCEMIA, WITHOUT LONG-TERM CURRENT USE OF INSULIN: ICD-10-CM

## 2022-11-14 DIAGNOSIS — E78.2 MIXED HYPERLIPIDEMIA: ICD-10-CM

## 2022-11-14 PROCEDURE — G0439 PR MEDICARE ANNUAL WELLNESS SUBSEQUENT VISIT: ICD-10-PCS | Mod: ,,, | Performed by: STUDENT IN AN ORGANIZED HEALTH CARE EDUCATION/TRAINING PROGRAM

## 2022-11-14 PROCEDURE — G0439 PPPS, SUBSEQ VISIT: HCPCS | Mod: ,,, | Performed by: STUDENT IN AN ORGANIZED HEALTH CARE EDUCATION/TRAINING PROGRAM

## 2022-11-14 RX ORDER — FLUOCINONIDE TOPICAL SOLUTION USP, 0.05% 0.5 MG/ML
SOLUTION TOPICAL
COMMUNITY
Start: 2022-09-12

## 2022-11-14 RX ORDER — CLOTRIMAZOLE AND BETAMETHASONE DIPROPIONATE 10; .64 MG/G; MG/G
CREAM TOPICAL
COMMUNITY
Start: 2022-04-25

## 2022-11-14 RX ORDER — DOXEPIN HYDROCHLORIDE 10 MG/1
10 CAPSULE ORAL NIGHTLY
COMMUNITY
Start: 2022-09-12 | End: 2022-11-29 | Stop reason: SDUPTHER

## 2022-11-14 RX ORDER — ROSUVASTATIN CALCIUM 20 MG/1
20 TABLET, COATED ORAL DAILY
Qty: 90 TABLET | Refills: 3 | Status: SHIPPED | OUTPATIENT
Start: 2022-11-14 | End: 2023-03-27 | Stop reason: SDUPTHER

## 2022-11-14 RX ORDER — KETOCONAZOLE 20 MG/ML
SHAMPOO, SUSPENSION TOPICAL
COMMUNITY
Start: 2022-09-12

## 2022-11-14 NOTE — ASSESSMENT & PLAN NOTE
- A1c 6.0 from 07/25/22.  - Patient should have follow-up with Ophthalmology.  - Will need diabetic foot exam at follow-up.  - Continue Glipizide 5mg daily.

## 2022-11-14 NOTE — PROGRESS NOTES
"Subjective:      Patient ID: Shilpa Dumont is a 82 y.o. German female. Patient accompanied by her eldest daughter, Ms. Margo Ball.    Chief Complaint: Medicare Wellness    Preventative Health: Patient's daughter expresses that the patient is getting her COVID booster this Friday and will wait 2 weeks to get her flu vaccine. Patient's daughter states the would like to contact us regarding ordering DEXA at a future follow-up.    NIDDMII: A1c 6 from 07/25/22. Patient taking Glipizide daily. She denies any hypoglycemic episodes.    HTN/HLD: /69. Patient states compliance with Norvasc, Doxazosin, Irbesartan, and Crestor.    Dermatitis: Patient following with Dermatology.    Review of Systems   Constitutional:  Negative for activity change, chills, fatigue and fever.   Eyes:  Negative for visual disturbance.   Respiratory:  Negative for apnea, cough and shortness of breath.    Cardiovascular:  Negative for chest pain and leg swelling.   Gastrointestinal:  Negative for abdominal pain, blood in stool, constipation, diarrhea, nausea and vomiting.   Genitourinary:  Negative for dysuria and hematuria.   Musculoskeletal:  Negative for arthralgias, back pain and myalgias.   Skin:  Positive for rash (improving). Negative for wound.   Neurological:  Negative for dizziness, weakness, numbness and headaches.   Psychiatric/Behavioral:  Negative for behavioral problems and dysphoric mood.      Objective:   /69 (BP Location: Right arm, Patient Position: Sitting, BP Method: Large (Automatic))   Pulse 83   Temp 98.2 °F (36.8 °C) (Oral)   Resp 18   Ht 5' 1" (1.549 m)   Wt 42.8 kg (94 lb 4.8 oz)   SpO2 97%   BMI 17.82 kg/m²     Physical Exam  Vitals and nursing note reviewed.   Constitutional:       General: She is not in acute distress.     Appearance: Normal appearance. She is not ill-appearing or toxic-appearing.   HENT:      Head: Normocephalic and atraumatic.      Mouth/Throat:      Mouth: Mucous membranes are " moist.      Pharynx: No oropharyngeal exudate or posterior oropharyngeal erythema.   Cardiovascular:      Rate and Rhythm: Normal rate and regular rhythm.      Heart sounds: Normal heart sounds. No murmur heard.  Pulmonary:      Effort: Pulmonary effort is normal. No respiratory distress.      Breath sounds: Normal breath sounds.   Abdominal:      General: There is no distension.      Palpations: Abdomen is soft.      Tenderness: There is no abdominal tenderness.   Musculoskeletal:         General: Normal range of motion.      Cervical back: Neck supple. No tenderness.      Right lower leg: No edema.      Left lower leg: No edema.   Lymphadenopathy:      Cervical: No cervical adenopathy.   Skin:     General: Skin is warm and dry.      Findings: No lesion or rash.   Neurological:      General: No focal deficit present.      Mental Status: She is alert. Mental status is at baseline.   Psychiatric:         Mood and Affect: Mood normal.         Behavior: Behavior normal.         Thought Content: Thought content normal.         Judgment: Judgment normal.     Assessment/Plan:   1. Medicare annual wellness visit, subsequent    2. Type 2 diabetes mellitus with hyperglycemia, without long-term current use of insulin  Assessment & Plan:  - A1c 6.0 from 07/25/22.  - Patient should have follow-up with Ophthalmology.  - Will need diabetic foot exam at follow-up.  - Continue Glipizide 5mg daily.    Orders:  -     Hemoglobin A1C; Future; Expected date: 11/14/2022  -     Microalbumin/Creatinine Ratio, Urine    3. Primary hypertension  Assessment & Plan:  - /69, well-controlled.  - Continue Norvasc 10mg daily, Irbesartan 300mg daily, and Doxazosin 4mg daily.      Orders:  -     Microalbumin/Creatinine Ratio, Urine    4. Mixed hyperlipidemia  Assessment & Plan:  - Continue Crestor 20mg daily.    Orders:  -     rosuvastatin (CRESTOR) 20 MG tablet; Take 1 tablet (20 mg total) by mouth once daily.  Dispense: 90 tablet; Refill:  3    5. Dermatitis  Assessment & Plan:  - Patient following with Dermatology.         Opioid Screening: Patient medication list reviewed, patient is not taking prescription opioids. Patient is not using additional opioids than prescribed. Patient is at low risk of substance abuse based on this opioid use history.     Patient Reported Health Risk Assessment  What is your age?: 80 or older  Are you male or female?: Female  During the past four weeks, how much have you been bothered by emotional problems such as feeling anxious, depressed, irritable, sad, or downhearted and blue?: Not at all  During the past five weeks, has your physical and/or emotional health limited your social activities with family, friends, neighbors, or groups?: Not at all  During the past four weeks, how much bodily pain have you generally had?: Very mild pain  During the past four weeks, was someone available to help if you needed and wanted help?: Yes, as much as I wanted  During the past four weeks, what was the hardest physical activity you could do for at least two minutes?: Light  Can you get to places out of walking distance without help?  (For example, can you travel alone on buses or taxis, or drive your own car?): No  Can you go shopping for groceries or clothes without someone's help?: No  Can you prepare your own meals?: Yes  Can you do your own housework without help?: No  Because of any health problems, do you need the help of another person with your personal care needs such as eating, bathing, dressing, or getting around the house?: No  Can you handle your own money without help?: No  During the past four weeks, how would you rate your health in general?: Good  How have things been going for you during the past four weeks?: Pretty well  Are you having difficulties driving your car?: No  Do you always fasten your seat belt when you are in a car?: Yes, usually  How often in the past four weeks have you been bothered by falling or  dizzy when standing up?: Never  How often in the past four weeks have you been bothered by trouble eating well?: Never  How often in the past four weeks have you been bothered by teeth or denture problems?: Never  How often in the past four weeks have you been bothered with problems using the telephone?: Never  How often in the past four weeks have you been bothered by tiredness or fatigue?: Seldom  Have you fallen two or more times in the past year?: No  Are you afraid of falling?: No  Are you a smoker?: No  During the past four weeks, how many drinks of wine, beer, or other alcoholic beverages did you have?: No alcohol at all  Do you exercise for about 20 minutes three or more days a week?: No, I usually do not exercise this much  Have you been given any information to help you with hazards in your house that might hurt you?: No  Have you been given any information to help you with keeping track of your medications?: No  How often do you have trouble taking medicines the way you've been told to take them?: I always take them as prescribed  How confident are you that you can control and manage most of your health problems?: Not very confident  What is your race? (Check all that apply.):     Medicare Annual Wellness and Personalized Prevention Plan:   Fall Risk + Home Safety + Hearing Impairment + Depression Screen + Opioid and Substance Abuse Screening + Cognitive Impairment Screen + Health Risk Assessment all reviewed.     Advance Care Planning   I attest to discussing Advance Care Planning with patient and/or family member.  Education was provided including the importance of the Health Care Power of , Advance Directives, and/or [place] documentation.  The patient expressed understanding to the importance of this information and discussion.       Follow up in about 6 months (around 5/14/2023) for Chronic Medical Management.

## 2022-11-14 NOTE — ASSESSMENT & PLAN NOTE
- /69, well-controlled.  - Continue Norvasc 10mg daily, Irbesartan 300mg daily, and Doxazosin 4mg daily.

## 2022-11-29 DIAGNOSIS — I10 PRIMARY HYPERTENSION: Primary | ICD-10-CM

## 2022-11-29 RX ORDER — AMLODIPINE BESYLATE 10 MG/1
10 TABLET ORAL DAILY
Qty: 90 TABLET | Refills: 3 | Status: SHIPPED | OUTPATIENT
Start: 2022-11-29 | End: 2023-06-08 | Stop reason: SDUPTHER

## 2022-11-29 RX ORDER — DOXEPIN HYDROCHLORIDE 10 MG/1
10 CAPSULE ORAL NIGHTLY
Qty: 90 CAPSULE | Refills: 3 | Status: SHIPPED | OUTPATIENT
Start: 2022-11-29 | End: 2023-05-15

## 2022-11-29 NOTE — TELEPHONE ENCOUNTER
----- Message from Lauryn Lin sent at 11/28/2022  4:53 PM CST -----  Regarding: refill  .Type:  RX Refill Request    Who Called: pt  Refill or New Rx:refill  RX Name and Strength:doxepin (SINEQUAN) 10 MG capsule  How is the patient currently taking it? (ex. 1XDay):  Is this a 30 day or 90 day RX:  Preferred Pharmacy with phone number:[facility] #[id number] - [place], [place] - [address] [facility]   Phone: [phone]  Local or Mail Order:local  Ordering Provider:Tamera  Would the patient rather a call back or a response via [facility]? Call back  Best Call Back Number:[phone]  Additional Information: pt needs authorization from  to refill prescription     .Type:  RX Refill Request    Who Called: pt  Refill or New Rx:refill  RX Name and Strength:amLODIPine (NORVASC) 10 MG tablet  How is the patient currently taking it? (ex. 1XDay):  Is this a 30 day or 90 day RX:  Preferred Pharmacy with phone number:[facility] #[id number] - [place], [place] - [address] [facility] Phone: [phone]  Local or Mail Order:local  Ordering Provider:Tamera  Would the patient rather a call back or a response via [facility]? Call back  Best Call Back Number:[phone]  Additional Information: pt needs authorization from  to refill prescription

## 2022-12-01 ENCOUNTER — TELEPHONE (OUTPATIENT)
Dept: FAMILY MEDICINE | Facility: CLINIC | Age: 82
End: 2022-12-01
Payer: MEDICARE

## 2022-12-01 DIAGNOSIS — H91.90 HEARING LOSS, UNSPECIFIED HEARING LOSS TYPE, UNSPECIFIED LATERALITY: Primary | ICD-10-CM

## 2022-12-01 NOTE — TELEPHONE ENCOUNTER
I have ordered the following. Please notify the patient.    Orders Placed This Encounter   Procedures    Ambulatory referral/consult to Audiology     Standing Status:   Future     Standing Expiration Date:   1/1/2024     Referral Priority:   Routine     Referral Type:   Audiology Exam     Referral Reason:   Specialty Services Required     Requested Specialty:   Audiology     Number of Visits Requested:   1

## 2022-12-01 NOTE — TELEPHONE ENCOUNTER
----- Message from Stefanie Jurado sent at 12/1/2022  1:52 PM CST -----  Regarding: Referral  .Type:  Needs Medical Advice    Who Called: Pt's daughter  Symptoms (please be specific):    How long has patient had these symptoms:    Pharmacy name and phone #:    Would the patient rather a call back or a response via [facility]? Call back  Best Call Back Number: [phone]  Additional Information: Pt's daughter requesting a referral to see audiologist, pt is having hard time hearing. [facility] [place] [phone]

## 2022-12-01 NOTE — TELEPHONE ENCOUNTER
Pts daughter notified that the referral has been place. Please refer pt to [facility]

## 2022-12-02 DIAGNOSIS — I10 PRIMARY HYPERTENSION: Primary | ICD-10-CM

## 2022-12-02 RX ORDER — DOXAZOSIN 4 MG/1
4 TABLET ORAL DAILY
Qty: 90 TABLET | Refills: 3 | Status: SHIPPED | OUTPATIENT
Start: 2022-12-02 | End: 2023-06-08 | Stop reason: SDUPTHER

## 2022-12-02 NOTE — TELEPHONE ENCOUNTER
Pts daughter notified of the medications indications and the the script for the Doxazosin was sent to the pharmacy. Verbal understanding was given

## 2022-12-27 ENCOUNTER — TELEPHONE (OUTPATIENT)
Dept: FAMILY MEDICINE | Facility: CLINIC | Age: 82
End: 2022-12-27
Payer: MEDICARE

## 2022-12-27 DIAGNOSIS — I10 PRIMARY HYPERTENSION: ICD-10-CM

## 2022-12-27 DIAGNOSIS — E11.65 TYPE 2 DIABETES MELLITUS WITH HYPERGLYCEMIA, WITHOUT LONG-TERM CURRENT USE OF INSULIN: Primary | ICD-10-CM

## 2022-12-27 RX ORDER — IRBESARTAN 300 MG/1
300 TABLET ORAL DAILY
Qty: 90 TABLET | Refills: 3 | Status: SHIPPED | OUTPATIENT
Start: 2022-12-27 | End: 2023-06-08 | Stop reason: SDUPTHER

## 2022-12-27 RX ORDER — GLIPIZIDE 5 MG/1
5 TABLET ORAL DAILY
Qty: 90 TABLET | Refills: 3 | Status: SHIPPED | OUTPATIENT
Start: 2022-12-27 | End: 2023-03-27 | Stop reason: SDUPTHER

## 2022-12-27 NOTE — TELEPHONE ENCOUNTER
----- Message from Stefanie Jurado sent at 12/27/2022  9:16 AM CST -----  Regarding: Med Refill  .Type:  RX Refill Request    Who Called: Pt's daughter  Refill or New Rx:Refill  RX Name and Strength:irbesartan (AVAPRO) 300 MG tablet  How is the patient currently taking it? (ex. 1XDay):1xday  Is this a 30 day or 90 day RX:  Preferred Pharmacy with phone number:[facility] #[id number]  [place] - [address] [facility]  Local or Mail Order:Local  Ordering Provider:Tamera  Would the patient rather a call back or a response via [employer] [employer]? Call back  Best Call Back Number:[phone]  Additional Information:     .Type:  RX Refill Request    Who Called: Pt's daughter  Refill or New Rx:Refill  RX Name and Strength:glipiZIDE (GLUCOTROL) 5 MG tablet  How is the patient currently taking it? (ex. 1XDay):1xday  Is this a 30 day or 90 day RX:  Preferred Pharmacy with phone number:[facility] #[id number] - [place] - [address] [facility]  Local or Mail Order:Local  Ordering Provider:Tamera  Would the patient rather a call back or a response via [facility]? Call back  Best Call Back Number:[phone]  Additional Information:

## 2023-03-06 ENCOUNTER — TELEPHONE (OUTPATIENT)
Dept: FAMILY MEDICINE | Facility: CLINIC | Age: 83
End: 2023-03-06
Payer: MEDICARE

## 2023-03-06 NOTE — TELEPHONE ENCOUNTER
Notified pts daughter, Taisha, that pt should have refills on all of her medications. I advised that she needs to contact [facility]'s for the refills. Verbal understanding given.

## 2023-03-27 ENCOUNTER — HOSPITAL ENCOUNTER (OUTPATIENT)
Dept: RADIOLOGY | Facility: HOSPITAL | Age: 83
Discharge: HOME | End: 2023-03-27
Attending: STUDENT IN AN ORGANIZED HEALTH CARE EDUCATION/TRAINING PROGRAM
Payer: MEDICARE

## 2023-03-27 ENCOUNTER — OFFICE VISIT (OUTPATIENT)
Dept: FAMILY MEDICINE | Facility: CLINIC | Age: 83
End: 2023-03-27
Payer: MEDICARE

## 2023-03-27 VITALS
HEIGHT: 61 IN | HEART RATE: 83 BPM | BODY MASS INDEX: 18.26 KG/M2 | SYSTOLIC BLOOD PRESSURE: 130 MMHG | RESPIRATION RATE: 18 BRPM | TEMPERATURE: 98 F | WEIGHT: 96.69 LBS | OXYGEN SATURATION: 98 % | DIASTOLIC BLOOD PRESSURE: 67 MMHG

## 2023-03-27 DIAGNOSIS — E78.2 MIXED HYPERLIPIDEMIA: ICD-10-CM

## 2023-03-27 DIAGNOSIS — R04.2 HEMOPTYSIS: ICD-10-CM

## 2023-03-27 DIAGNOSIS — R04.2 HEMOPTYSIS: Primary | ICD-10-CM

## 2023-03-27 DIAGNOSIS — E11.65 TYPE 2 DIABETES MELLITUS WITH HYPERGLYCEMIA, WITHOUT LONG-TERM CURRENT USE OF INSULIN: ICD-10-CM

## 2023-03-27 PROCEDURE — 99214 OFFICE O/P EST MOD 30 MIN: CPT | Mod: ,,, | Performed by: STUDENT IN AN ORGANIZED HEALTH CARE EDUCATION/TRAINING PROGRAM

## 2023-03-27 PROCEDURE — 99214 PR OFFICE/OUTPT VISIT, EST, LEVL IV, 30-39 MIN: ICD-10-PCS | Mod: ,,, | Performed by: STUDENT IN AN ORGANIZED HEALTH CARE EDUCATION/TRAINING PROGRAM

## 2023-03-27 PROCEDURE — 71046 X-RAY EXAM CHEST 2 VIEWS: CPT | Mod: TC

## 2023-03-27 RX ORDER — ROSUVASTATIN CALCIUM 20 MG/1
20 TABLET, COATED ORAL DAILY
Qty: 90 TABLET | Refills: 3 | Status: SHIPPED | OUTPATIENT
Start: 2023-03-27 | End: 2024-01-08 | Stop reason: SDUPTHER

## 2023-03-27 RX ORDER — GLIPIZIDE 5 MG/1
5 TABLET ORAL DAILY
Qty: 90 TABLET | Refills: 3 | Status: SHIPPED | OUTPATIENT
Start: 2023-03-27 | End: 2023-07-10 | Stop reason: SDUPTHER

## 2023-03-27 NOTE — PROGRESS NOTES
"  Subjective:      Patient ID: Shilpa Dumont is a 83 y.o. Mongolian female. She is accompanied by her daughter, Ms. Taisha Ball who also serves as her .    Chief Complaint: Hemoptysis    Hemoptysis: Patient had 1 episode of coughing up blood 1-2 weeks ago. Patient says it was just maybe a, "small spit." She denies any further episodes since then.    Review of Systems   Constitutional:  Negative for activity change, chills, diaphoresis, fatigue and fever.   HENT:  Negative for congestion, sore throat and trouble swallowing.    Eyes:  Negative for visual disturbance.   Respiratory:  Positive for cough (hemoptysis). Negative for apnea and shortness of breath.    Cardiovascular:  Negative for chest pain and palpitations.   Gastrointestinal:  Negative for abdominal pain, blood in stool, nausea and vomiting.   Musculoskeletal:  Negative for arthralgias, back pain and myalgias.   Neurological:  Negative for dizziness, syncope, weakness, numbness and headaches.     Objective:   /67 (BP Location: Right arm, Patient Position: Sitting, BP Method: Small (Automatic))   Pulse 83   Temp 98.2 °F (36.8 °C) (Oral)   Resp 18   Ht 5' 1" (1.549 m)   Wt 43.9 kg (96 lb 11.2 oz)   SpO2 98%   BMI 18.27 kg/m²     Physical Exam  Vitals and nursing note reviewed.   Constitutional:       General: She is not in acute distress.     Appearance: Normal appearance. She is not ill-appearing or toxic-appearing.   HENT:      Head: Normocephalic and atraumatic.      Mouth/Throat:      Mouth: Mucous membranes are moist.      Pharynx: Oropharynx is clear.   Eyes:      Conjunctiva/sclera: Conjunctivae normal.   Cardiovascular:      Rate and Rhythm: Normal rate and regular rhythm.      Heart sounds: Normal heart sounds. No murmur heard.  Pulmonary:      Effort: Pulmonary effort is normal. No respiratory distress.      Breath sounds: Normal breath sounds.   Abdominal:      General: There is no distension.      Palpations: Abdomen is soft. "      Tenderness: There is no abdominal tenderness.   Musculoskeletal:         General: No deformity.      Right lower leg: No edema.      Left lower leg: No edema.   Skin:     General: Skin is warm and dry.      Findings: No lesion or rash.   Neurological:      General: No focal deficit present.      Mental Status: She is alert. Mental status is at baseline.   Psychiatric:         Mood and Affect: Mood normal.         Behavior: Behavior normal.         Thought Content: Thought content normal.         Judgment: Judgment normal.     Assessment/Plan:   1. Hemoptysis  Comments:  - Vitals signs stable.  - Physical exam benign.  - CXR for further evaluation.  Orders:  -     X-Ray Chest PA And Lateral; Future; Expected date: 03/27/2023    2. Type 2 diabetes mellitus with hyperglycemia, without long-term current use of insulin  Assessment & Plan:  - A1c 6.0 from 07/25/22.  - Will need diabetic foot exam at follow-up.  - Continue Glipizide 5mg daily.    Orders:  -     glipiZIDE (GLUCOTROL) 5 MG tablet; Take 1 tablet (5 mg total) by mouth once daily.  Dispense: 90 tablet; Refill: 3    3. Mixed hyperlipidemia  Assessment & Plan:  - Continue Crestor 20mg daily.    Orders:  -     rosuvastatin (CRESTOR) 20 MG tablet; Take 1 tablet (20 mg total) by mouth once daily.  Dispense: 90 tablet; Refill: 3

## 2023-03-27 NOTE — ASSESSMENT & PLAN NOTE
- A1c 6.0 from 07/25/22.  - Will need diabetic foot exam at follow-up.  - Continue Glipizide 5mg daily.

## 2023-05-15 ENCOUNTER — OFFICE VISIT (OUTPATIENT)
Dept: FAMILY MEDICINE | Facility: CLINIC | Age: 83
End: 2023-05-15
Payer: MEDICARE

## 2023-05-15 VITALS
SYSTOLIC BLOOD PRESSURE: 129 MMHG | WEIGHT: 98.19 LBS | RESPIRATION RATE: 18 BRPM | DIASTOLIC BLOOD PRESSURE: 74 MMHG | TEMPERATURE: 99 F | HEART RATE: 86 BPM | HEIGHT: 61 IN | OXYGEN SATURATION: 95 % | BODY MASS INDEX: 18.54 KG/M2

## 2023-05-15 DIAGNOSIS — I10 PRIMARY HYPERTENSION: ICD-10-CM

## 2023-05-15 DIAGNOSIS — E11.65 TYPE 2 DIABETES MELLITUS WITH HYPERGLYCEMIA, WITHOUT LONG-TERM CURRENT USE OF INSULIN: Primary | ICD-10-CM

## 2023-05-15 DIAGNOSIS — L30.9 DERMATITIS: ICD-10-CM

## 2023-05-15 DIAGNOSIS — E78.2 MIXED HYPERLIPIDEMIA: ICD-10-CM

## 2023-05-15 PROBLEM — G47.00 INSOMNIA: Status: ACTIVE | Noted: 2023-05-15

## 2023-05-15 PROBLEM — G47.00 INSOMNIA: Status: RESOLVED | Noted: 2023-05-15 | Resolved: 2023-05-15

## 2023-05-15 PROCEDURE — 99214 PR OFFICE/OUTPT VISIT, EST, LEVL IV, 30-39 MIN: ICD-10-PCS | Mod: ,,, | Performed by: STUDENT IN AN ORGANIZED HEALTH CARE EDUCATION/TRAINING PROGRAM

## 2023-05-15 PROCEDURE — 99214 OFFICE O/P EST MOD 30 MIN: CPT | Mod: ,,, | Performed by: STUDENT IN AN ORGANIZED HEALTH CARE EDUCATION/TRAINING PROGRAM

## 2023-05-15 NOTE — PROGRESS NOTES
"Subjective:      Patient ID: Shilpa Dumont is a 83 y.o. [place] female. Patient accompanied by her son-in-law, Mr. Grace, who also serves as her .    Chief Complaint: Chronic Medical Management    NIDDMII: A1c 6 from 07/25/22. Patient taking Glipizide daily. She denies any hypoglycemic episodes.     HTN/HLD: /74. Patient states compliance with Norvasc, Doxazosin, Irbesartan, and Crestor.    Dermatitis: Patient following with Dermatology.    Preventative Health: Medicare Wellness completed on 11/14/22.    Review of Systems   Constitutional:  Negative for activity change, chills, fatigue and fever.   Eyes:  Negative for visual disturbance.   Respiratory:  Negative for apnea, cough and shortness of breath.    Cardiovascular:  Negative for chest pain and leg swelling.   Gastrointestinal:  Negative for abdominal pain, blood in stool, constipation, diarrhea, nausea and vomiting.   Genitourinary:  Negative for dysuria and hematuria.   Musculoskeletal:  Negative for arthralgias, back pain and myalgias.   Skin:  Negative for rash and wound.   Neurological:  Negative for dizziness, weakness, numbness and headaches.   Psychiatric/Behavioral:  Negative for behavioral problems, dysphoric mood and sleep disturbance.      Objective:   /74 (BP Location: Right arm, Patient Position: Sitting, BP Method: Small (Automatic))   Pulse 86   Temp 98.5 °F (36.9 °C) (Oral)   Resp 18   Ht 5' 1" (1.549 m)   Wt 44.5 kg (98 lb 3.2 oz)   SpO2 95%   BMI 18.55 kg/m²     Physical Exam  Vitals and nursing note reviewed.   Constitutional:       General: She is not in acute distress.     Appearance: Normal appearance. She is not ill-appearing or toxic-appearing.   HENT:      Head: Normocephalic and atraumatic.   Cardiovascular:      Rate and Rhythm: Normal rate and regular rhythm.      Heart sounds: Normal heart sounds. No murmur heard.  Pulmonary:      Effort: Pulmonary effort is normal. No respiratory distress.      Breath " sounds: Normal breath sounds.   Abdominal:      General: There is no distension.      Palpations: Abdomen is soft.      Tenderness: There is no abdominal tenderness.   Musculoskeletal:         General: Normal range of motion.      Right lower leg: No edema.      Left lower leg: No edema.   Skin:     General: Skin is warm and dry.      Findings: No lesion or rash.   Neurological:      General: No focal deficit present.      Mental Status: She is alert. Mental status is at baseline.   Psychiatric:         Mood and Affect: Mood normal.         Behavior: Behavior normal.         Thought Content: Thought content normal.         Judgment: Judgment normal.     Assessment/Plan:   1. Type 2 diabetes mellitus with hyperglycemia, without long-term current use of insulin  Assessment & Plan:  - A1c 6.0 from 07/25/22.  - Continue Glipizide 5mg daily.    Orders:  -     Hemoglobin A1C; Future; Expected date: 11/15/2023  -     CBC Without Differential; Future; Expected date: 11/15/2023  -     Basic Metabolic Panel; Future; Expected date: 11/15/2023  -     Microalbumin/Creatinine Ratio, Urine; Future; Expected date: 11/15/2023    2. Primary hypertension  Assessment & Plan:  - BP well-controlled.  - Continue Norvasc 10mg daily, Irbesartan 300mg daily, and Doxazosin 4mg daily.      Orders:  -     CBC Without Differential; Future; Expected date: 11/15/2023  -     Basic Metabolic Panel; Future; Expected date: 11/15/2023  -     Microalbumin/Creatinine Ratio, Urine; Future; Expected date: 11/15/2023    3. Mixed hyperlipidemia  Assessment & Plan:  - Continue Crestor 20mg daily.    Orders:  -     Lipid Panel; Future; Expected date: 11/15/2023    4. Dermatitis  Assessment & Plan:  - Patient following with Dermatology.         Follow up in about 6 months (around 11/15/2023) for Medicare Wellness.

## 2023-05-15 NOTE — ASSESSMENT & PLAN NOTE
- BP well-controlled.  - Continue Norvasc 10mg daily, Irbesartan 300mg daily, and Doxazosin 4mg daily.

## 2023-06-08 DIAGNOSIS — I10 PRIMARY HYPERTENSION: Primary | ICD-10-CM

## 2023-06-08 RX ORDER — AMLODIPINE BESYLATE 10 MG/1
10 TABLET ORAL DAILY
Qty: 90 TABLET | Refills: 3 | Status: SHIPPED | OUTPATIENT
Start: 2023-06-08 | End: 2023-09-08 | Stop reason: SDUPTHER

## 2023-06-08 RX ORDER — IRBESARTAN 300 MG/1
300 TABLET ORAL DAILY
Qty: 90 TABLET | Refills: 3 | Status: SHIPPED | OUTPATIENT
Start: 2023-06-08

## 2023-06-08 RX ORDER — DOXAZOSIN 4 MG/1
4 TABLET ORAL DAILY
Qty: 90 TABLET | Refills: 3 | Status: SHIPPED | OUTPATIENT
Start: 2023-06-08 | End: 2023-09-08 | Stop reason: SDUPTHER

## 2023-07-10 DIAGNOSIS — E11.65 TYPE 2 DIABETES MELLITUS WITH HYPERGLYCEMIA, WITHOUT LONG-TERM CURRENT USE OF INSULIN: ICD-10-CM

## 2023-07-10 RX ORDER — GLIPIZIDE 5 MG/1
5 TABLET ORAL DAILY
Qty: 90 TABLET | Refills: 3 | Status: SHIPPED | OUTPATIENT
Start: 2023-07-10 | End: 2024-01-08 | Stop reason: SDUPTHER

## 2023-09-08 DIAGNOSIS — I10 PRIMARY HYPERTENSION: ICD-10-CM

## 2023-09-08 RX ORDER — AMLODIPINE BESYLATE 10 MG/1
10 TABLET ORAL DAILY
Qty: 90 TABLET | Refills: 3 | Status: SHIPPED | OUTPATIENT
Start: 2023-09-08

## 2023-09-08 RX ORDER — DOXAZOSIN 4 MG/1
4 TABLET ORAL DAILY
Qty: 90 TABLET | Refills: 3 | Status: SHIPPED | OUTPATIENT
Start: 2023-09-08

## 2023-11-21 ENCOUNTER — LAB VISIT (OUTPATIENT)
Dept: LAB | Facility: HOSPITAL | Age: 83
End: 2023-11-21
Attending: STUDENT IN AN ORGANIZED HEALTH CARE EDUCATION/TRAINING PROGRAM
Payer: MEDICARE

## 2023-11-21 DIAGNOSIS — E78.2 MIXED HYPERLIPIDEMIA: ICD-10-CM

## 2023-11-21 DIAGNOSIS — E11.65 TYPE 2 DIABETES MELLITUS WITH HYPERGLYCEMIA, WITHOUT LONG-TERM CURRENT USE OF INSULIN: ICD-10-CM

## 2023-11-21 DIAGNOSIS — I10 PRIMARY HYPERTENSION: ICD-10-CM

## 2023-11-21 DIAGNOSIS — R79.89 ELEVATED SERUM CREATININE: Primary | ICD-10-CM

## 2023-11-21 LAB
ANION GAP SERPL CALC-SCNC: 7 MEQ/L
BUN SERPL-MCNC: 36.2 MG/DL (ref 9.8–20.1)
CALCIUM SERPL-MCNC: 9.3 MG/DL (ref 8.4–10.2)
CHLORIDE SERPL-SCNC: 110 MMOL/L (ref 98–107)
CHOLEST SERPL-MCNC: 133 MG/DL
CHOLEST/HDLC SERPL: 3 {RATIO} (ref 0–5)
CO2 SERPL-SCNC: 25 MMOL/L (ref 23–31)
CREAT SERPL-MCNC: 1.32 MG/DL (ref 0.55–1.02)
CREAT UR-MCNC: 161 MG/DL (ref 45–106)
CREAT/UREA NIT SERPL: 27
ERYTHROCYTE [DISTWIDTH] IN BLOOD BY AUTOMATED COUNT: 14.1 % (ref 11.5–17)
EST. AVERAGE GLUCOSE BLD GHB EST-MCNC: 125.5 MG/DL
GFR SERPLBLD CREATININE-BSD FMLA CKD-EPI: 40 MLS/MIN/1.73/M2
GLUCOSE SERPL-MCNC: 158 MG/DL (ref 82–115)
HBA1C MFR BLD: 6 %
HCT VFR BLD AUTO: 30.8 % (ref 37–47)
HDLC SERPL-MCNC: 48 MG/DL (ref 35–60)
HGB BLD-MCNC: 10 G/DL (ref 12–16)
LDLC SERPL CALC-MCNC: 69 MG/DL (ref 50–140)
MCH RBC QN AUTO: 31.1 PG (ref 27–31)
MCHC RBC AUTO-ENTMCNC: 32.5 G/DL (ref 33–36)
MCV RBC AUTO: 95.7 FL (ref 80–94)
MICROALBUMIN UR-MCNC: 297.6 UG/ML
MICROALBUMIN/CREAT RATIO PNL UR: 184.8 MG/GM CR (ref 0–30)
NRBC BLD AUTO-RTO: 0 %
PLATELET # BLD AUTO: 193 X10(3)/MCL (ref 130–400)
PMV BLD AUTO: 10 FL (ref 7.4–10.4)
POTASSIUM SERPL-SCNC: 4.5 MMOL/L (ref 3.5–5.1)
RBC # BLD AUTO: 3.22 X10(6)/MCL (ref 4.2–5.4)
SODIUM SERPL-SCNC: 142 MMOL/L (ref 136–145)
TRIGL SERPL-MCNC: 80 MG/DL (ref 37–140)
VLDLC SERPL CALC-MCNC: 16 MG/DL
WBC # SPEC AUTO: 4.32 X10(3)/MCL (ref 4.5–11.5)

## 2023-11-21 PROCEDURE — 82043 UR ALBUMIN QUANTITATIVE: CPT

## 2023-11-21 PROCEDURE — 83036 HEMOGLOBIN GLYCOSYLATED A1C: CPT

## 2023-11-21 PROCEDURE — 80048 BASIC METABOLIC PNL TOTAL CA: CPT

## 2023-11-21 PROCEDURE — 36415 COLL VENOUS BLD VENIPUNCTURE: CPT

## 2023-11-21 PROCEDURE — 80061 LIPID PANEL: CPT

## 2023-11-21 PROCEDURE — 85027 COMPLETE CBC AUTOMATED: CPT

## 2023-11-28 DIAGNOSIS — R79.89 ELEVATED SERUM CREATININE: Primary | ICD-10-CM

## 2023-11-28 DIAGNOSIS — D53.9 MACROCYTIC ANEMIA: ICD-10-CM

## 2023-12-18 ENCOUNTER — TELEPHONE (OUTPATIENT)
Dept: FAMILY MEDICINE | Facility: CLINIC | Age: 83
End: 2023-12-18
Payer: MEDICARE

## 2023-12-18 NOTE — TELEPHONE ENCOUNTER
----- Message from Maria De Jesus Valentin sent at 12/18/2023  3:43 PM CST -----  Regarding: advice  Type:  Needs Medical Advice    Who Called: pt daughter    Best Call Back Number: [phone]  Additional Information: pt was prescribe 3 different bp medication and she wanted to know how pt is suppose to take the medication . Please advise    irbesartan (AVAPRO) 300 MG tablet  amLODIPine (NORVASC) 10 MG tablet  doxazosin (CARDURA) 4 MG tablet

## 2024-01-08 ENCOUNTER — OFFICE VISIT (OUTPATIENT)
Dept: FAMILY MEDICINE | Facility: CLINIC | Age: 84
End: 2024-01-08
Payer: MEDICARE

## 2024-01-08 VITALS
WEIGHT: 97.5 LBS | SYSTOLIC BLOOD PRESSURE: 124 MMHG | DIASTOLIC BLOOD PRESSURE: 65 MMHG | RESPIRATION RATE: 18 BRPM | HEIGHT: 61 IN | BODY MASS INDEX: 18.41 KG/M2 | OXYGEN SATURATION: 98 % | HEART RATE: 77 BPM

## 2024-01-08 DIAGNOSIS — Z00.00 MEDICARE ANNUAL WELLNESS VISIT, SUBSEQUENT: Primary | ICD-10-CM

## 2024-01-08 DIAGNOSIS — L30.9 DERMATITIS: ICD-10-CM

## 2024-01-08 DIAGNOSIS — E11.65 TYPE 2 DIABETES MELLITUS WITH HYPERGLYCEMIA, WITHOUT LONG-TERM CURRENT USE OF INSULIN: ICD-10-CM

## 2024-01-08 DIAGNOSIS — I10 PRIMARY HYPERTENSION: ICD-10-CM

## 2024-01-08 DIAGNOSIS — E78.2 MIXED HYPERLIPIDEMIA: ICD-10-CM

## 2024-01-08 PROCEDURE — G0439 PPPS, SUBSEQ VISIT: HCPCS | Mod: ,,, | Performed by: STUDENT IN AN ORGANIZED HEALTH CARE EDUCATION/TRAINING PROGRAM

## 2024-01-08 RX ORDER — GLIPIZIDE 5 MG/1
5 TABLET ORAL DAILY
Qty: 90 TABLET | Refills: 3 | Status: SHIPPED | OUTPATIENT
Start: 2024-01-08

## 2024-01-08 RX ORDER — ROSUVASTATIN CALCIUM 20 MG/1
20 TABLET, COATED ORAL DAILY
Qty: 90 TABLET | Refills: 3 | Status: SHIPPED | OUTPATIENT
Start: 2024-01-08

## 2024-01-08 NOTE — ASSESSMENT & PLAN NOTE
- A1c 6 from 11/21/23.  - Urine microalbumin/Cr ratio UTD.  - Will need to infer about diabetic eye exam at follow-up.  - Continue Glipizide 5mg daily.

## 2024-01-08 NOTE — PROGRESS NOTES
"Subjective:      Patient ID: Shilpa Dumont is a 83 y.o. Armenian female. She is accompanied by her son-in-law, Mr. Grace, who also serves as her .     Chief Complaint: Medicare Wellness    Medicare Wellness: Patient not amenable to any vaccinations at this time.    NIDDMII: A1c 6 from 11/21/23. Patient taking Glipizide daily. She denies any hypoglycemic episodes. Patient would like to defer eye exam to future follow-up.     HTN/HLD: /65. Patient states compliance with Norvasc, Doxazosin, Irbesartan, and Crestor.     Dermatitis: Patient following with Dermatology.    Review of Systems   Constitutional:  Negative for activity change, appetite change, chills, diaphoresis, fatigue, fever and unexpected weight change.   Eyes:  Negative for visual disturbance.   Respiratory:  Negative for apnea, cough and shortness of breath.    Cardiovascular:  Negative for chest pain, palpitations and leg swelling.   Gastrointestinal:  Negative for abdominal pain, blood in stool, constipation, diarrhea, nausea and vomiting.   Genitourinary:  Negative for dysuria and hematuria.   Musculoskeletal:  Negative for arthralgias, back pain and myalgias.   Skin:  Negative for rash and wound.   Neurological:  Negative for dizziness, syncope, weakness, numbness and headaches.   Psychiatric/Behavioral:  Negative for behavioral problems, dysphoric mood and sleep disturbance. The patient is not nervous/anxious.      Objective:   /65 (BP Location: Right arm, Patient Position: Sitting, BP Method: Small (Automatic))   Pulse 77   Resp 18   Ht 5' 1" (1.549 m)   Wt 44.2 kg (97 lb 8 oz)   SpO2 98%   BMI 18.42 kg/m²     Physical Exam  Vitals and nursing note reviewed.   Constitutional:       General: She is not in acute distress.     Appearance: Normal appearance. She is not ill-appearing or toxic-appearing.   HENT:      Head: Normocephalic and atraumatic.      Mouth/Throat:      Mouth: Mucous membranes are moist.      Pharynx: " Oropharynx is clear. No oropharyngeal exudate or posterior oropharyngeal erythema.   Eyes:      General:         Right eye: No discharge.         Left eye: No discharge.      Conjunctiva/sclera: Conjunctivae normal.   Cardiovascular:      Rate and Rhythm: Normal rate and regular rhythm.      Heart sounds: Normal heart sounds. No murmur heard.  Pulmonary:      Effort: Pulmonary effort is normal. No respiratory distress.      Breath sounds: Normal breath sounds.   Abdominal:      General: There is no distension.      Palpations: Abdomen is soft.      Tenderness: There is no abdominal tenderness.   Musculoskeletal:         General: No deformity. Normal range of motion.      Cervical back: Neck supple. No tenderness.      Right lower leg: No edema.      Left lower leg: No edema.   Lymphadenopathy:      Cervical: No cervical adenopathy.   Skin:     General: Skin is warm and dry.      Findings: No lesion or rash.   Neurological:      General: No focal deficit present.      Mental Status: She is alert. Mental status is at baseline.      Motor: Weakness (generalized) present.      Coordination: Coordination normal.   Psychiatric:         Mood and Affect: Mood normal.         Behavior: Behavior normal.         Thought Content: Thought content normal.         Judgment: Judgment normal.       Assessment/Plan:   1. Medicare annual wellness visit, subsequent  Comments:  - Health maintenance reviewed.  Orders:  -     Comprehensive Metabolic Panel; Future; Expected date: 01/08/2024    2. Type 2 diabetes mellitus with hyperglycemia, without long-term current use of insulin  Assessment & Plan:  - A1c 6 from 11/21/23.  - Urine microalbumin/Cr ratio UTD.  - Will need to infer about diabetic eye exam at follow-up.  - Continue Glipizide 5mg daily.    Orders:  -     Comprehensive Metabolic Panel; Future; Expected date: 01/08/2024  -     glipiZIDE (GLUCOTROL) 5 MG tablet; Take 1 tablet (5 mg total) by mouth once daily.  Dispense: 90  tablet; Refill: 3    3. Primary hypertension  Assessment & Plan:  - BP well-controlled.  - Continue Norvasc 10mg daily, Irbesartan 300mg daily, and Doxazosin 4mg daily.      Orders:  -     Comprehensive Metabolic Panel; Future; Expected date: 01/08/2024    4. Mixed hyperlipidemia  Assessment & Plan:  - Continue Crestor 20mg daily.    Orders:  -     rosuvastatin (CRESTOR) 20 MG tablet; Take 1 tablet (20 mg total) by mouth once daily.  Dispense: 90 tablet; Refill: 3    5. Dermatitis  Assessment & Plan:  - Stable.  - Patient following with Dermatology.           Opioid Screening: Patient medication list reviewed, patient is not taking prescription opioids. Patient is not using additional opioids than prescribed. Patient is at low risk of substance abuse based on this opioid use history.     Patient Reported Health Risk Assessment  What is your age?: 80 or older  Are you male or female?: Female  During the past four weeks, how much have you been bothered by emotional problems such as feeling anxious, depressed, irritable, sad, or downhearted and blue?: Not at all  During the past five weeks, has your physical and/or emotional health limited your social activities with family, friends, neighbors, or groups?: Not at all  During the past four weeks, how much bodily pain have you generally had?: No pain  During the past four weeks, was someone available to help if you needed and wanted help?: Yes, as much as I wanted  During the past four weeks, what was the hardest physical activity you could do for at least two minutes?: Very heavy  Can you get to places out of walking distance without help?  (For example, can you travel alone on buses or taxis, or drive your own car?): Yes  Can you go shopping for groceries or clothes without someone's help?: Yes  Can you prepare your own meals?: Yes  Can you do your own housework without help?: Yes  Because of any health problems, do you need the help of another person with your  personal care needs such as eating, bathing, dressing, or getting around the house?: No  Can you handle your own money without help?: Yes  During the past four weeks, how would you rate your health in general?: Excellent  How have things been going for you during the past four weeks?: Very well  Are you having difficulties driving your car?: Not applicable, I do not use a car  Do you always fasten your seat belt when you are in a car?: Yes, usually  How often in the past four weeks have you been bothered by falling or dizzy when standing up?: Never  How often in the past four weeks have you been bothered by sexual problems?: Never  How often in the past four weeks have you been bothered by trouble eating well?: Never  How often in the past four weeks have you been bothered by teeth or denture problems?: Never  How often in the past four weeks have you been bothered with problems using the telephone?: Never  How often in the past four weeks have you been bothered by tiredness or fatigue?: Never  Have you fallen two or more times in the past year?: No  Are you afraid of falling?: No  Are you a smoker?: No  During the past four weeks, how many drinks of wine, beer, or other alcoholic beverages did you have?: No alcohol at all  Do you exercise for about 20 minutes three or more days a week?: Yes, most of the time  Have you been given any information to help you with hazards in your house that might hurt you?: Yes  Have you been given any information to help you with keeping track of your medications?: Yes  How often do you have trouble taking medicines the way you've been told to take them?: I always take them as prescribed  How confident are you that you can control and manage most of your health problems?: Very confident  What is your race? (Check all that apply.):     Medicare Annual Wellness and Personalized Prevention Plan:   Fall Risk + Home Safety + Hearing Impairment + Depression Screen + Opioid and  Substance Abuse Screening + Cognitive Impairment Screen + Health Risk Assessment all reviewed.     Advance Care Planning   I attest to discussing Advance Care Planning with patient and/or family member.  Education was provided including the importance of the Health Care Power of , Advance Directives, and/or [place] documentation.  The patient expressed understanding to the importance of this information and discussion.       Follow up in about 3 months (around 4/8/2024) for Chronic Medical Management.

## 2024-04-09 ENCOUNTER — OFFICE VISIT (OUTPATIENT)
Dept: FAMILY MEDICINE | Facility: CLINIC | Age: 84
End: 2024-04-09
Payer: MEDICARE

## 2024-04-09 VITALS
HEIGHT: 61 IN | BODY MASS INDEX: 17.77 KG/M2 | RESPIRATION RATE: 16 BRPM | DIASTOLIC BLOOD PRESSURE: 72 MMHG | SYSTOLIC BLOOD PRESSURE: 144 MMHG | TEMPERATURE: 97 F | OXYGEN SATURATION: 96 % | WEIGHT: 94.13 LBS | HEART RATE: 92 BPM

## 2024-04-09 DIAGNOSIS — E11.65 TYPE 2 DIABETES MELLITUS WITH HYPERGLYCEMIA, WITHOUT LONG-TERM CURRENT USE OF INSULIN: Primary | ICD-10-CM

## 2024-04-09 DIAGNOSIS — E78.2 MIXED HYPERLIPIDEMIA: ICD-10-CM

## 2024-04-09 DIAGNOSIS — I10 PRIMARY HYPERTENSION: ICD-10-CM

## 2024-04-09 DIAGNOSIS — L30.9 DERMATITIS: ICD-10-CM

## 2024-04-09 PROCEDURE — 99214 OFFICE O/P EST MOD 30 MIN: CPT | Mod: ,,, | Performed by: STUDENT IN AN ORGANIZED HEALTH CARE EDUCATION/TRAINING PROGRAM

## 2024-04-09 NOTE — PROGRESS NOTES
"Subjective:      Patient ID: Shilpa Dumont is a 84 y.o. Irish female. She is accompanied by her daughter, Ms. Margo Ball.    Chief Complaint: Chronic Medical Management    NIDDMII: A1c 6 from 11/21/23. Patient taking Glipizide daily. She denies any hypoglycemic episodes. Patient would like to defer eye exam to future follow-up.     HTN/HLD: /72. Patient states compliance with Norvasc, Doxazosin, Irbesartan, and Crestor.     Dermatitis: Patient following with Dermatology.    Preventative Health: Medicare Wellness completed on 01/08/24.    Review of Systems   Constitutional:  Negative for activity change, appetite change, chills, diaphoresis, fatigue, fever and unexpected weight change.   Eyes:  Negative for visual disturbance.   Respiratory:  Negative for apnea, cough and shortness of breath.    Cardiovascular:  Negative for chest pain, palpitations and leg swelling.   Gastrointestinal:  Negative for abdominal pain, blood in stool, constipation, diarrhea, nausea and vomiting.   Genitourinary:  Negative for dysuria and hematuria.   Musculoskeletal:  Negative for arthralgias, back pain and myalgias.   Skin:  Negative for rash and wound.   Neurological:  Negative for dizziness, syncope, weakness, numbness and headaches.   Psychiatric/Behavioral:  Negative for behavioral problems, dysphoric mood and sleep disturbance. The patient is not nervous/anxious.      Objective:   BP (!) 144/72 (BP Location: Right arm)   Pulse 92   Temp 97.4 °F (36.3 °C)   Resp 16   Ht 5' 1" (1.549 m)   Wt 42.7 kg (94 lb 1.6 oz)   SpO2 96%   BMI 17.78 kg/m²     Physical Exam  Vitals and nursing note reviewed.   Constitutional:       General: She is not in acute distress.     Appearance: Normal appearance. She is not ill-appearing or toxic-appearing.   HENT:      Head: Normocephalic and atraumatic.   Eyes:      General:         Right eye: No discharge.         Left eye: No discharge.      Conjunctiva/sclera: Conjunctivae normal. "   Cardiovascular:      Rate and Rhythm: Normal rate and regular rhythm.      Heart sounds: Normal heart sounds. No murmur heard.  Pulmonary:      Effort: Pulmonary effort is normal. No respiratory distress.      Breath sounds: Normal breath sounds.   Abdominal:      General: There is no distension.      Palpations: Abdomen is soft.      Tenderness: There is no abdominal tenderness.   Musculoskeletal:         General: No deformity. Normal range of motion.      Right lower leg: No edema.      Left lower leg: No edema.   Skin:     General: Skin is warm and dry.      Findings: No lesion or rash.   Neurological:      General: No focal deficit present.      Mental Status: She is alert. Mental status is at baseline.      Motor: Weakness (generalized) present.      Coordination: Coordination normal.   Psychiatric:         Mood and Affect: Mood normal.         Behavior: Behavior normal.         Thought Content: Thought content normal.         Judgment: Judgment normal.       Assessment/Plan:   1. Type 2 diabetes mellitus with hyperglycemia, without long-term current use of insulin  Assessment & Plan:  - A1c for routine monitoring.  - Urine microalbumin/Cr ratio UTD.  - Will need to infer about diabetic eye exam at follow-up.  - Continue Glipizide 5mg daily.    Orders:  -     Diabetic Eye Screening Photo; Future  -     Ambulatory referral/consult to Ophthalmology; Future; Expected date: 04/16/2024    2. Primary hypertension  Assessment & Plan:  - BP well-controlled.  - Continue Norvasc 10mg daily, Irbesartan 300mg daily, and Doxazosin 4mg daily.      3. Mixed hyperlipidemia  Assessment & Plan:  - Continue Crestor 20mg daily.      4. Dermatitis  Assessment & Plan:  - Stable.  - Patient following with Dermatology.         Follow up in about 6 months (around 10/9/2024) for Chronic Medical Management.

## 2024-04-09 NOTE — ASSESSMENT & PLAN NOTE
- A1c for routine monitoring.  - Urine microalbumin/Cr ratio UTD.  - Will need to infer about diabetic eye exam at follow-up.  - Continue Glipizide 5mg daily.

## 2024-04-10 ENCOUNTER — LAB VISIT (OUTPATIENT)
Dept: LAB | Facility: HOSPITAL | Age: 84
End: 2024-04-10
Attending: STUDENT IN AN ORGANIZED HEALTH CARE EDUCATION/TRAINING PROGRAM
Payer: MEDICARE

## 2024-04-10 DIAGNOSIS — D53.9 MACROCYTIC ANEMIA: ICD-10-CM

## 2024-04-10 DIAGNOSIS — E11.65 TYPE 2 DIABETES MELLITUS WITH HYPERGLYCEMIA, WITHOUT LONG-TERM CURRENT USE OF INSULIN: ICD-10-CM

## 2024-04-10 DIAGNOSIS — R79.89 ELEVATED SERUM CREATININE: ICD-10-CM

## 2024-04-10 LAB
ANION GAP SERPL CALC-SCNC: 8 MEQ/L
BASOPHILS # BLD AUTO: 0.03 X10(3)/MCL
BASOPHILS NFR BLD AUTO: 0.6 %
BUN SERPL-MCNC: 24.7 MG/DL (ref 9.8–20.1)
CALCIUM SERPL-MCNC: 9.2 MG/DL (ref 8.4–10.2)
CHLORIDE SERPL-SCNC: 110 MMOL/L (ref 98–107)
CO2 SERPL-SCNC: 25 MMOL/L (ref 23–31)
CREAT SERPL-MCNC: 1.03 MG/DL (ref 0.55–1.02)
CREAT/UREA NIT SERPL: 24
EOSINOPHIL # BLD AUTO: 0.41 X10(3)/MCL (ref 0–0.9)
EOSINOPHIL NFR BLD AUTO: 8.3 %
ERYTHROCYTE [DISTWIDTH] IN BLOOD BY AUTOMATED COUNT: 13.9 % (ref 11.5–17)
FERRITIN SERPL-MCNC: 251.39 NG/ML (ref 4.63–204)
FOLATE SERPL-MCNC: 14.5 NG/ML (ref 7–31.4)
GFR SERPLBLD CREATININE-BSD FMLA CKD-EPI: 54 MLS/MIN/1.73/M2
GLUCOSE SERPL-MCNC: 161 MG/DL (ref 82–115)
HCT VFR BLD AUTO: 35.1 % (ref 37–47)
HGB BLD-MCNC: 11.3 G/DL (ref 12–16)
IMM GRANULOCYTES # BLD AUTO: 0.01 X10(3)/MCL (ref 0–0.04)
IMM GRANULOCYTES NFR BLD AUTO: 0.2 %
IRON SATN MFR SERPL: 24 % (ref 20–50)
IRON SERPL-MCNC: 62 UG/DL (ref 50–170)
LYMPHOCYTES # BLD AUTO: 1.2 X10(3)/MCL (ref 0.6–4.6)
LYMPHOCYTES NFR BLD AUTO: 24.4 %
MCH RBC QN AUTO: 30.6 PG (ref 27–31)
MCHC RBC AUTO-ENTMCNC: 32.2 G/DL (ref 33–36)
MCV RBC AUTO: 95.1 FL (ref 80–94)
MONOCYTES # BLD AUTO: 0.31 X10(3)/MCL (ref 0.1–1.3)
MONOCYTES NFR BLD AUTO: 6.3 %
NEUTROPHILS # BLD AUTO: 2.96 X10(3)/MCL (ref 2.1–9.2)
NEUTROPHILS NFR BLD AUTO: 60.2 %
NRBC BLD AUTO-RTO: 0 %
PLATELET # BLD AUTO: 220 X10(3)/MCL (ref 130–400)
PMV BLD AUTO: 9.7 FL (ref 7.4–10.4)
POTASSIUM SERPL-SCNC: 4.3 MMOL/L (ref 3.5–5.1)
RBC # BLD AUTO: 3.69 X10(6)/MCL (ref 4.2–5.4)
SODIUM SERPL-SCNC: 143 MMOL/L (ref 136–145)
TIBC SERPL-MCNC: 198 UG/DL (ref 70–310)
TIBC SERPL-MCNC: 260 UG/DL (ref 250–450)
TRANSFERRIN SERPL-MCNC: 226 MG/DL
TSH SERPL-ACNC: 2.69 UIU/ML (ref 0.35–4.94)
VIT B12 SERPL-MCNC: 862 PG/ML (ref 213–816)
WBC # SPEC AUTO: 4.92 X10(3)/MCL (ref 4.5–11.5)

## 2024-04-10 PROCEDURE — 85025 COMPLETE CBC W/AUTO DIFF WBC: CPT

## 2024-04-10 PROCEDURE — 82746 ASSAY OF FOLIC ACID SERUM: CPT

## 2024-04-10 PROCEDURE — 80048 BASIC METABOLIC PNL TOTAL CA: CPT

## 2024-04-10 PROCEDURE — 84443 ASSAY THYROID STIM HORMONE: CPT

## 2024-04-10 PROCEDURE — 83540 ASSAY OF IRON: CPT

## 2024-04-10 PROCEDURE — 36415 COLL VENOUS BLD VENIPUNCTURE: CPT

## 2024-04-10 PROCEDURE — 82607 VITAMIN B-12: CPT

## 2024-04-10 PROCEDURE — 82728 ASSAY OF FERRITIN: CPT

## 2024-04-12 DIAGNOSIS — E11.65 TYPE 2 DIABETES MELLITUS WITH HYPERGLYCEMIA, WITHOUT LONG-TERM CURRENT USE OF INSULIN: Primary | ICD-10-CM

## 2024-04-15 ENCOUNTER — TELEPHONE (OUTPATIENT)
Dept: FAMILY MEDICINE | Facility: CLINIC | Age: 84
End: 2024-04-15
Payer: MEDICARE

## 2024-04-15 NOTE — TELEPHONE ENCOUNTER
If patient is off her Doxazosin, would need to be able to monitor her blood pressures. Would they be able to do this?

## 2024-04-15 NOTE — TELEPHONE ENCOUNTER
Patient daughter Taisha agreed to track BP.    States she will contact office end of week or beginning of next week with readings.     States that is she feel BP is unstable/elevated she will contact office sooner.

## 2024-04-15 NOTE — TELEPHONE ENCOUNTER
Daughter Taisha [phone] during labs discussion wanted to inform you of upcoming surgery.    Daughter states mom is have cataracts surgery in May.    [facility] eye doctor requested patient be off of Doxazosin prior to surgery it will at least be 2 weeks off medication.    Daughter wants to know if you are agreeable with this recommendation.     Please Advise.

## 2024-04-23 LAB — VIEW PATHOLOGY REPORT (RELIAPATH): NORMAL

## 2024-04-24 ENCOUNTER — OFFICE VISIT (OUTPATIENT)
Dept: FAMILY MEDICINE | Facility: CLINIC | Age: 84
End: 2024-04-24
Payer: MEDICARE

## 2024-04-24 VITALS
OXYGEN SATURATION: 98 % | HEART RATE: 83 BPM | TEMPERATURE: 96 F | HEIGHT: 61 IN | SYSTOLIC BLOOD PRESSURE: 152 MMHG | BODY MASS INDEX: 17.94 KG/M2 | DIASTOLIC BLOOD PRESSURE: 78 MMHG | WEIGHT: 95 LBS | RESPIRATION RATE: 16 BRPM

## 2024-04-24 DIAGNOSIS — H25.9 SENILE CATARACT OF RIGHT EYE, UNSPECIFIED AGE-RELATED CATARACT TYPE: ICD-10-CM

## 2024-04-24 DIAGNOSIS — Z01.818 PREOPERATIVE EXAMINATION: Primary | ICD-10-CM

## 2024-04-24 PROCEDURE — 99214 OFFICE O/P EST MOD 30 MIN: CPT | Mod: ,,, | Performed by: STUDENT IN AN ORGANIZED HEALTH CARE EDUCATION/TRAINING PROGRAM

## 2024-04-24 NOTE — PROGRESS NOTES
"Subjective:      Patient ID: Shilpa Dumont is a 84 y.o. [place]istan[place] female. She is accompanied by her son-in-law, Mr. Grace.    Chief Complaint: Pre-Operative Examination    Pre-Operative Examination: Patient undergoing cataract surgery of her right eye under conscious sedation on 05/14/24 at [facility]. Her Doxazosin is being held for 2 weeks prior to her cataract surgery. She is able to climb 2 flights of stairs. RCRI = 0 points = Class I Risk.    Review of Systems   Constitutional:  Negative for activity change, appetite change, chills, diaphoresis, fatigue, fever and unexpected weight change.   Eyes:  Positive for visual disturbance (cataract).   Respiratory:  Negative for apnea, cough and shortness of breath.    Cardiovascular:  Negative for chest pain, palpitations and leg swelling.   Gastrointestinal:  Negative for abdominal pain, blood in stool, constipation, diarrhea, nausea and vomiting.   Genitourinary:  Negative for dysuria and hematuria.   Musculoskeletal:  Negative for arthralgias, back pain and myalgias.   Skin:  Negative for rash and wound.   Neurological:  Negative for dizziness, syncope, weakness, numbness and headaches.   Psychiatric/Behavioral:  Negative for behavioral problems, dysphoric mood and sleep disturbance. The patient is not nervous/anxious.      Objective:   BP (!) 152/78 (BP Location: Right arm)   Pulse 83   Temp 96.4 °F (35.8 °C) (Temporal)   Resp 16   Ht 5' 1" (1.549 m)   Wt 43.1 kg (95 lb)   SpO2 98%   BMI 17.95 kg/m²     Physical Exam  Vitals and nursing note reviewed.   Constitutional:       General: She is not in acute distress.     Appearance: Normal appearance. She is not ill-appearing or toxic-appearing.   HENT:      Head: Normocephalic and atraumatic.      Mouth/Throat:      Mouth: Mucous membranes are moist.      Pharynx: Oropharynx is clear.   Eyes:      General:         Right eye: No discharge.         Left eye: No discharge.      Conjunctiva/sclera: " Conjunctivae normal.      Comments: Lens opacities noted.   Cardiovascular:      Rate and Rhythm: Normal rate and regular rhythm.      Heart sounds: Normal heart sounds. No murmur heard.  Pulmonary:      Effort: Pulmonary effort is normal. No respiratory distress.      Breath sounds: Normal breath sounds.   Abdominal:      General: There is no distension.      Palpations: Abdomen is soft.      Tenderness: There is no abdominal tenderness.   Musculoskeletal:         General: No deformity. Normal range of motion.      Cervical back: Neck supple. No tenderness.      Right lower leg: No edema.      Left lower leg: No edema.   Lymphadenopathy:      Cervical: No cervical adenopathy.   Skin:     General: Skin is warm and dry.      Findings: No lesion or rash.   Neurological:      General: No focal deficit present.      Mental Status: She is alert. Mental status is at baseline.      Motor: Weakness (generalized) present.      Coordination: Coordination normal.   Psychiatric:         Mood and Affect: Mood normal.         Behavior: Behavior normal.         Thought Content: Thought content normal.         Judgment: Judgment normal.       Assessment/Plan:   1. Preoperative examination  Comments:  - RCRI = 0 points = Class I Risk. Risk reviewed with patient.  - Functional status intact.  - Patient medically maximized to proceed with surgery.    2. Senile cataract of right eye, unspecified age-related cataract type  Comments:  - Refer to Preoperative examination plan.

## 2024-07-18 ENCOUNTER — OFFICE VISIT (OUTPATIENT)
Dept: FAMILY MEDICINE | Facility: CLINIC | Age: 84
End: 2024-07-18
Payer: MEDICARE

## 2024-07-18 VITALS
TEMPERATURE: 98 F | BODY MASS INDEX: 18.28 KG/M2 | WEIGHT: 96.81 LBS | DIASTOLIC BLOOD PRESSURE: 74 MMHG | SYSTOLIC BLOOD PRESSURE: 148 MMHG | HEART RATE: 93 BPM | HEIGHT: 61 IN | RESPIRATION RATE: 16 BRPM | OXYGEN SATURATION: 98 %

## 2024-07-18 DIAGNOSIS — Z01.818 PREOPERATIVE EXAMINATION: Primary | ICD-10-CM

## 2024-07-18 DIAGNOSIS — M17.0 PRIMARY OSTEOARTHRITIS OF BOTH KNEES: ICD-10-CM

## 2024-07-18 DIAGNOSIS — E78.2 MIXED HYPERLIPIDEMIA: ICD-10-CM

## 2024-07-18 DIAGNOSIS — E11.65 TYPE 2 DIABETES MELLITUS WITH HYPERGLYCEMIA, WITHOUT LONG-TERM CURRENT USE OF INSULIN: ICD-10-CM

## 2024-07-18 PROCEDURE — 99214 OFFICE O/P EST MOD 30 MIN: CPT | Mod: ,,, | Performed by: STUDENT IN AN ORGANIZED HEALTH CARE EDUCATION/TRAINING PROGRAM

## 2024-07-18 RX ORDER — ROSUVASTATIN CALCIUM 20 MG/1
20 TABLET, COATED ORAL DAILY
Qty: 90 TABLET | Refills: 3 | Status: SHIPPED | OUTPATIENT
Start: 2024-07-18

## 2024-07-18 RX ORDER — GLIPIZIDE 5 MG/1
5 TABLET ORAL DAILY
Qty: 90 TABLET | Refills: 3 | Status: SHIPPED | OUTPATIENT
Start: 2024-07-18

## 2024-07-18 RX ORDER — DICLOFENAC SODIUM 10 MG/G
2 GEL TOPICAL 4 TIMES DAILY PRN
Qty: 150 G | Refills: 0 | Status: SHIPPED | OUTPATIENT
Start: 2024-07-18

## 2024-07-18 NOTE — PROGRESS NOTES
"Subjective:      Patient ID: Shilpa Dumont is a 84 y.o. Kitt[place] female. She is accompanied by her son-in-law, Mr. Grace.     Chief Complaint: Pre-Operative Examination    Pre-Operative Examination: Patient undergoing cataract surgery of her left eye at [facility] on 07/23/24. Her Doxazosin has been held for her cataract surgery. She is able to climb 2 flights of stairs. RCRI = 0 points = Class I Risk.     Review of Systems   Constitutional:  Negative for activity change, appetite change, chills, diaphoresis, fatigue, fever and unexpected weight change.   Eyes:  Positive for visual disturbance (cataract).   Respiratory:  Negative for apnea, cough and shortness of breath.    Cardiovascular:  Negative for chest pain, palpitations and leg swelling.   Gastrointestinal:  Negative for abdominal pain, blood in stool, constipation, diarrhea, nausea and vomiting.   Genitourinary:  Negative for dysuria and hematuria.   Musculoskeletal:  Negative for arthralgias, back pain and myalgias.   Skin:  Negative for rash and wound.   Neurological:  Negative for dizziness, syncope, weakness, numbness and headaches.   Psychiatric/Behavioral:  Negative for behavioral problems, dysphoric mood and sleep disturbance. The patient is not nervous/anxious.      Objective:   BP (!) 148/74 (BP Location: Right arm)   Pulse 93   Temp 98.1 °F (36.7 °C) (Temporal)   Resp 16   Ht 5' 1" (1.549 m)   Wt 43.9 kg (96 lb 12.8 oz)   SpO2 98%   BMI 18.29 kg/m²     Physical Exam  Vitals and nursing note reviewed.   Constitutional:       General: She is not in acute distress.     Appearance: Normal appearance. She is not ill-appearing or toxic-appearing.   HENT:      Head: Normocephalic and atraumatic.      Mouth/Throat:      Mouth: Mucous membranes are moist.      Pharynx: Oropharynx is clear.   Eyes:      General:         Right eye: No discharge.         Left eye: No discharge.      Conjunctiva/sclera: Conjunctivae normal.      Comments: Lens " opacities noted.   Cardiovascular:      Rate and Rhythm: Normal rate and regular rhythm.      Heart sounds: Normal heart sounds. No murmur heard.  Pulmonary:      Effort: Pulmonary effort is normal. No respiratory distress.      Breath sounds: Normal breath sounds.   Abdominal:      General: There is no distension.      Palpations: Abdomen is soft.      Tenderness: There is no abdominal tenderness.   Musculoskeletal:         General: No deformity. Normal range of motion.      Cervical back: Neck supple. No tenderness.      Right lower leg: No edema.      Left lower leg: No edema.   Lymphadenopathy:      Cervical: No cervical adenopathy.   Skin:     General: Skin is warm and dry.      Findings: No lesion or rash.   Neurological:      General: No focal deficit present.      Mental Status: She is alert. Mental status is at baseline.      Motor: Weakness (generalized) present.      Coordination: Coordination normal.   Psychiatric:         Mood and Affect: Mood normal.         Behavior: Behavior normal.         Thought Content: Thought content normal.         Judgment: Judgment normal.       Assessment/Plan:   1. Preoperative examination  Comments:  - RCRI = 0 points = Class I Risk. Risk reviewed with patient.  - Functional status intact.  - Patient medically maximized to proceed with surgery.    2. Type 2 diabetes mellitus with hyperglycemia, without long-term current use of insulin  Assessment & Plan:  - A1c 6 from 11/21/23.  - Urine microalbumin/Cr ratio UTD.  - Patient following with [facility].  - Continue Glipizide 5mg daily.    Orders:  -     glipiZIDE (GLUCOTROL) 5 MG tablet; Take 1 tablet (5 mg total) by mouth once daily.  Dispense: 90 tablet; Refill: 3    3. Mixed hyperlipidemia  Assessment & Plan:  - Continue Crestor 20mg daily.    Orders:  -     rosuvastatin (CRESTOR) 20 MG tablet; Take 1 tablet (20 mg total) by mouth once daily.  Dispense: 90 tablet; Refill: 3    4. Primary osteoarthritis of both  knees  -     diclofenac sodium (VOLTAREN ARTHRITIS PAIN) 1 % Gel; Apply 2 g topically 4 (four) times daily as needed (knee pain).  Dispense: 150 g; Refill: 0

## 2024-07-18 NOTE — ASSESSMENT & PLAN NOTE
- A1c 6 from 11/21/23.  - Urine microalbumin/Cr ratio UTD.  - Patient following with [facility].  - Continue Glipizide 5mg daily.

## 2024-09-01 DIAGNOSIS — I10 PRIMARY HYPERTENSION: ICD-10-CM

## 2024-09-03 RX ORDER — IRBESARTAN 300 MG/1
300 TABLET ORAL
Qty: 90 TABLET | Refills: 3 | Status: SHIPPED | OUTPATIENT
Start: 2024-09-03

## 2024-09-17 DIAGNOSIS — I10 PRIMARY HYPERTENSION: ICD-10-CM

## 2024-09-17 RX ORDER — DOXAZOSIN 4 MG/1
4 TABLET ORAL
Qty: 90 TABLET | Refills: 3 | Status: SHIPPED | OUTPATIENT
Start: 2024-09-17

## 2024-10-07 ENCOUNTER — TELEPHONE (OUTPATIENT)
Dept: FAMILY MEDICINE | Facility: CLINIC | Age: 84
End: 2024-10-07
Payer: MEDICARE

## 2024-10-07 NOTE — TELEPHONE ENCOUNTER
----- Message from Saul sent at 10/7/2024 12:23 PM CDT -----  .Who Called: Tc Grace (son in law)    Caller is requesting assistance/information from provider's office.    Symptoms (please be specific): n/a   How long has patient had these symptoms:  n/a  List of preferred pharmacies on file (remove unneeded): [unfilled]  If different, enter pharmacy into here including location and phone number: n/a      Preferred Method of Contact: Phone Call    Patient's Preferred Phone Number on File: [phone]     Best Call Back Number, if different: [phone] (Tc)    Additional Information:  Wanting to schedule a nurse visit to receive flu vaccine. Please advise, thank you.

## 2024-10-09 ENCOUNTER — CLINICAL SUPPORT (OUTPATIENT)
Dept: FAMILY MEDICINE | Facility: CLINIC | Age: 84
End: 2024-10-09
Payer: MEDICARE

## 2024-10-09 DIAGNOSIS — Z23 NEED FOR INFLUENZA VACCINATION: Primary | ICD-10-CM

## 2024-10-09 PROCEDURE — 90653 IIV ADJUVANT VACCINE IM: CPT | Mod: ,,, | Performed by: STUDENT IN AN ORGANIZED HEALTH CARE EDUCATION/TRAINING PROGRAM

## 2024-10-09 PROCEDURE — G0008 ADMIN INFLUENZA VIRUS VAC: HCPCS | Mod: ,,, | Performed by: STUDENT IN AN ORGANIZED HEALTH CARE EDUCATION/TRAINING PROGRAM

## 2024-10-09 NOTE — PROGRESS NOTES
Pt in office for nurse visit. Pt received high dose flu vaccination in the right deltoid. Pt tolerated well with no reaction.

## 2024-10-14 ENCOUNTER — OFFICE VISIT (OUTPATIENT)
Dept: FAMILY MEDICINE | Facility: CLINIC | Age: 84
End: 2024-10-14
Payer: MEDICARE

## 2024-10-14 VITALS
BODY MASS INDEX: 18.94 KG/M2 | HEIGHT: 61 IN | OXYGEN SATURATION: 97 % | RESPIRATION RATE: 18 BRPM | DIASTOLIC BLOOD PRESSURE: 67 MMHG | SYSTOLIC BLOOD PRESSURE: 135 MMHG | WEIGHT: 100.31 LBS | HEART RATE: 98 BPM | TEMPERATURE: 98 F

## 2024-10-14 DIAGNOSIS — I10 PRIMARY HYPERTENSION: ICD-10-CM

## 2024-10-14 DIAGNOSIS — L30.9 DERMATITIS: ICD-10-CM

## 2024-10-14 DIAGNOSIS — E11.65 TYPE 2 DIABETES MELLITUS WITH HYPERGLYCEMIA, WITHOUT LONG-TERM CURRENT USE OF INSULIN: Primary | ICD-10-CM

## 2024-10-14 DIAGNOSIS — E78.2 MIXED HYPERLIPIDEMIA: ICD-10-CM

## 2024-10-14 PROCEDURE — 99214 OFFICE O/P EST MOD 30 MIN: CPT | Mod: ,,, | Performed by: STUDENT IN AN ORGANIZED HEALTH CARE EDUCATION/TRAINING PROGRAM

## 2024-10-14 NOTE — ASSESSMENT & PLAN NOTE
- Labs for routine monitoring.  - Patient following with [facility].  - Continue Glipizide 5mg daily.

## 2024-10-14 NOTE — PROGRESS NOTES
"Subjective:      Patient ID: Shilpa Dumont is a 84 y.o. [place] female. She is accompanied by her daughter, Ms. Taisha Ball who also serves as her .     Chief Complaint: Chronic Medical Management    NIDDMII: A1c 6 from 11/21/23. Patient taking Glipizide daily. She is following with [facility]. She denies any hypoglycemic episodes.     HTN/HLD: /67. Patient states compliance with Norvasc, Doxazosin, Irbesartan, and Crestor.     Dermatitis: Patient following with Dermatology.     Left Knee Osteoarthritis: Patient would like to consider Ortho referral and get back with us.    Preventative Health: Medicare Wellness completed on 01/08/24.     Review of Systems   Constitutional:  Negative for activity change, appetite change, chills, diaphoresis, fatigue, fever and unexpected weight change.   Eyes:  Negative for visual disturbance.   Respiratory:  Negative for apnea, cough and shortness of breath.    Cardiovascular:  Negative for chest pain, palpitations and leg swelling.   Gastrointestinal:  Negative for abdominal pain, blood in stool, constipation, diarrhea, nausea and vomiting.   Genitourinary:  Negative for dysuria and hematuria.   Musculoskeletal:  Positive for arthralgias. Negative for back pain and myalgias.   Skin:  Negative for rash and wound.   Neurological:  Negative for dizziness, syncope, weakness, numbness and headaches.   Psychiatric/Behavioral:  Negative for behavioral problems, dysphoric mood and sleep disturbance. The patient is not nervous/anxious.      Objective:   /67   Pulse 98   Temp 98.2 °F (36.8 °C) (Oral)   Resp 18   Ht 5' 1" (1.549 m)   Wt 45.5 kg (100 lb 4.8 oz)   SpO2 97%   BMI 18.95 kg/m²     Physical Exam  Vitals and nursing note reviewed.   Constitutional:       General: She is not in acute distress.     Appearance: Normal appearance. She is not ill-appearing or toxic-appearing.   HENT:      Head: Normocephalic and atraumatic.   Eyes:      General:    "      Right eye: No discharge.         Left eye: No discharge.      Conjunctiva/sclera: Conjunctivae normal.   Cardiovascular:      Rate and Rhythm: Normal rate and regular rhythm.      Heart sounds: Normal heart sounds. No murmur heard.  Pulmonary:      Effort: Pulmonary effort is normal. No respiratory distress.      Breath sounds: Normal breath sounds.   Abdominal:      General: There is no distension.      Palpations: Abdomen is soft.      Tenderness: There is no abdominal tenderness.   Musculoskeletal:         General: Swelling (left knee) and tenderness (left knee) present. No deformity. Normal range of motion.      Right lower leg: No edema.      Left lower leg: No edema.   Skin:     General: Skin is warm and dry.      Findings: No lesion or rash.   Neurological:      General: No focal deficit present.      Mental Status: She is alert. Mental status is at baseline.      Motor: Weakness (generalized) present.      Coordination: Coordination normal.   Psychiatric:         Mood and Affect: Mood normal.         Behavior: Behavior normal.         Thought Content: Thought content normal.         Judgment: Judgment normal.       Assessment/Plan:   1. Type 2 diabetes mellitus with hyperglycemia, without long-term current use of insulin  Assessment & Plan:  - Labs for routine monitoring.  - Patient following with [facility].  - Continue Glipizide 5mg daily.    Orders:  -     CBC Auto Differential; Future; Expected date: 10/14/2024  -     Comprehensive Metabolic Panel; Future; Expected date: 10/14/2024  -     Hemoglobin A1C; Future; Expected date: 10/14/2024  -     Microalbumin/Creatinine Ratio, Urine; Future; Expected date: 10/14/2024    2. Primary hypertension  Assessment & Plan:  - BP well-controlled.  - Continue Norvasc 10mg daily, Irbesartan 300mg daily, and Doxazosin 4mg daily.    Orders:  -     CBC Auto Differential; Future; Expected date: 10/14/2024  -     Comprehensive Metabolic Panel; Future;  Expected date: 10/14/2024    3. Mixed hyperlipidemia  Assessment & Plan:  - Continue Crestor 20mg daily.    Orders:  -     Lipid Panel; Future; Expected date: 10/14/2024    4. Dermatitis  Assessment & Plan:  - Stable.  - Patient following with Dermatology.         Follow up in about 3 months (around 1/14/2025) for Medicare Wellness.

## 2024-10-21 ENCOUNTER — LAB VISIT (OUTPATIENT)
Dept: LAB | Facility: HOSPITAL | Age: 84
End: 2024-10-21
Attending: STUDENT IN AN ORGANIZED HEALTH CARE EDUCATION/TRAINING PROGRAM
Payer: MEDICARE

## 2024-10-21 DIAGNOSIS — E11.65 TYPE 2 DIABETES MELLITUS WITH HYPERGLYCEMIA, WITHOUT LONG-TERM CURRENT USE OF INSULIN: ICD-10-CM

## 2024-10-21 DIAGNOSIS — I10 PRIMARY HYPERTENSION: ICD-10-CM

## 2024-10-21 DIAGNOSIS — E78.2 MIXED HYPERLIPIDEMIA: ICD-10-CM

## 2024-10-21 LAB
ALBUMIN SERPL-MCNC: 3.4 G/DL (ref 3.4–4.8)
ALBUMIN/GLOB SERPL: 0.9 RATIO (ref 1.1–2)
ALP SERPL-CCNC: 94 UNIT/L (ref 40–150)
ALT SERPL-CCNC: 19 UNIT/L (ref 0–55)
ANION GAP SERPL CALC-SCNC: 7 MEQ/L
AST SERPL-CCNC: 20 UNIT/L (ref 5–34)
BASOPHILS # BLD AUTO: 0.03 X10(3)/MCL
BASOPHILS NFR BLD AUTO: 0.6 %
BILIRUB SERPL-MCNC: 0.3 MG/DL
BUN SERPL-MCNC: 23.3 MG/DL (ref 9.8–20.1)
CALCIUM SERPL-MCNC: 9.5 MG/DL (ref 8.4–10.2)
CHLORIDE SERPL-SCNC: 110 MMOL/L (ref 98–107)
CHOLEST SERPL-MCNC: 135 MG/DL
CHOLEST/HDLC SERPL: 3 {RATIO} (ref 0–5)
CO2 SERPL-SCNC: 26 MMOL/L (ref 23–31)
CREAT SERPL-MCNC: 1.25 MG/DL (ref 0.55–1.02)
CREAT UR-MCNC: 100.8 MG/DL (ref 45–106)
CREAT/UREA NIT SERPL: 19
EOSINOPHIL # BLD AUTO: 0.5 X10(3)/MCL (ref 0–0.9)
EOSINOPHIL NFR BLD AUTO: 9.4 %
ERYTHROCYTE [DISTWIDTH] IN BLOOD BY AUTOMATED COUNT: 13.7 % (ref 11.5–17)
EST. AVERAGE GLUCOSE BLD GHB EST-MCNC: 128.4 MG/DL
GFR SERPLBLD CREATININE-BSD FMLA CKD-EPI: 43 ML/MIN/1.73/M2
GLOBULIN SER-MCNC: 4 GM/DL (ref 2.4–3.5)
GLUCOSE SERPL-MCNC: 163 MG/DL (ref 82–115)
HBA1C MFR BLD: 6.1 %
HCT VFR BLD AUTO: 32.6 % (ref 37–47)
HDLC SERPL-MCNC: 42 MG/DL (ref 35–60)
HGB BLD-MCNC: 10.7 G/DL (ref 12–16)
IMM GRANULOCYTES # BLD AUTO: 0.01 X10(3)/MCL (ref 0–0.04)
IMM GRANULOCYTES NFR BLD AUTO: 0.2 %
LDLC SERPL CALC-MCNC: 66 MG/DL (ref 50–140)
LYMPHOCYTES # BLD AUTO: 1.42 X10(3)/MCL (ref 0.6–4.6)
LYMPHOCYTES NFR BLD AUTO: 26.7 %
MCH RBC QN AUTO: 31 PG (ref 27–31)
MCHC RBC AUTO-ENTMCNC: 32.8 G/DL (ref 33–36)
MCV RBC AUTO: 94.5 FL (ref 80–94)
MICROALBUMIN UR-MCNC: 1491.2 UG/ML
MICROALBUMIN/CREAT RATIO PNL UR: 1479.4 MG/GM CR (ref 0–30)
MONOCYTES # BLD AUTO: 0.47 X10(3)/MCL (ref 0.1–1.3)
MONOCYTES NFR BLD AUTO: 8.9 %
NEUTROPHILS # BLD AUTO: 2.88 X10(3)/MCL (ref 2.1–9.2)
NEUTROPHILS NFR BLD AUTO: 54.2 %
NRBC BLD AUTO-RTO: 0 %
PLATELET # BLD AUTO: 198 X10(3)/MCL (ref 130–400)
PMV BLD AUTO: 9.3 FL (ref 7.4–10.4)
POTASSIUM SERPL-SCNC: 4.1 MMOL/L (ref 3.5–5.1)
PROT SERPL-MCNC: 7.4 GM/DL (ref 5.8–7.6)
RBC # BLD AUTO: 3.45 X10(6)/MCL (ref 4.2–5.4)
SODIUM SERPL-SCNC: 143 MMOL/L (ref 136–145)
TRIGL SERPL-MCNC: 136 MG/DL (ref 37–140)
VLDLC SERPL CALC-MCNC: 27 MG/DL
WBC # BLD AUTO: 5.31 X10(3)/MCL (ref 4.5–11.5)

## 2024-10-21 PROCEDURE — 83036 HEMOGLOBIN GLYCOSYLATED A1C: CPT

## 2024-10-21 PROCEDURE — 80061 LIPID PANEL: CPT

## 2024-10-21 PROCEDURE — 36415 COLL VENOUS BLD VENIPUNCTURE: CPT

## 2024-10-21 PROCEDURE — 80053 COMPREHEN METABOLIC PANEL: CPT

## 2024-10-21 PROCEDURE — 82570 ASSAY OF URINE CREATININE: CPT

## 2024-10-21 PROCEDURE — 85025 COMPLETE CBC W/AUTO DIFF WBC: CPT

## 2024-11-05 ENCOUNTER — TELEPHONE (OUTPATIENT)
Dept: FAMILY MEDICINE | Facility: CLINIC | Age: 84
End: 2024-11-05
Payer: MEDICARE

## 2024-11-05 NOTE — TELEPHONE ENCOUNTER
----- Message from Kale sent at 11/5/2024  2:48 PM CST -----  Who Called: Taisha- pt daughter     Caller is requesting assistance/information from provider's office.    Symptoms (please be specific):    How long has patient had these symptoms:    List of preferred pharmacies on file (remove unneeded): [unfilled]  If different, enter pharmacy into here including location and phone number:         Patient's Preferred Phone Number on File: [phone]   Best Call Back Number, if different:   Additional Information: pt daughter called to req H Pylori test, asked for a call to discuss.

## 2024-11-07 ENCOUNTER — TELEPHONE (OUTPATIENT)
Dept: FAMILY MEDICINE | Facility: CLINIC | Age: 84
End: 2024-11-07
Payer: MEDICARE

## 2024-11-07 NOTE — TELEPHONE ENCOUNTER
----- Message from Meez sent at 11/7/2024  2:10 PM CST -----  Who Called: Pts daughter-Taisha     Patient is returning phone call    Who Left Message for Patient:   Does the patient know what this is regarding?:      Preferred Method of Contact: Phone Call  Patient's Preferred Phone Number on File: [phone]   Best Call Back Number, if different:  Additional Information: Pts daughter Taisha stated that pt is not having any current GI symptoms.

## 2024-11-07 NOTE — TELEPHONE ENCOUNTER
----- Message from Holly sent at 11/7/2024 11:57 AM CST -----  Who Called: Taisha-pts daughter    Caller is requesting assistance/information from provider's office.    Symptoms (please be specific):    How long has patient had these symptoms:    List of preferred pharmacies on file (remove unneeded):   Preferred Method of Contact: Phone Call  Patient's Preferred Phone Number on File: [phone]   Best Call Back Number, if different:  Additional Information: caller stated that pts other daughter who lives with her tested positive for  Helicobacter pylori and would like for her mother to get tested for this. Please advise.     Phone: [phone]-Taisha

## 2024-11-07 NOTE — TELEPHONE ENCOUNTER
aller stated that pts other daughter who lives with her tested positive for  Helicobacter pylori and would like for her mother to get tested for this. Please advise.

## 2024-11-08 NOTE — TELEPHONE ENCOUNTER
Indications for H. Pylori testing include: gastric marginal zone lymphoma of mucosa-associated lymphoid tissue (MALT lymphoma), active peptic ulcer disease or past history of peptic ulcer if cure of H. Pylori has not been documented, and early gastric cancer.

## 2024-11-08 NOTE — TELEPHONE ENCOUNTER
Pt not experiencing any symptoms. Pt's daughter that pt lives with tested positive for H. Pylori and is requesting pt be tested as well.

## 2024-12-09 DIAGNOSIS — I10 PRIMARY HYPERTENSION: ICD-10-CM

## 2024-12-09 RX ORDER — AMLODIPINE BESYLATE 10 MG/1
10 TABLET ORAL
Qty: 90 TABLET | Refills: 1 | Status: SHIPPED | OUTPATIENT
Start: 2024-12-09

## 2024-12-13 ENCOUNTER — OFFICE VISIT (OUTPATIENT)
Dept: FAMILY MEDICINE | Facility: CLINIC | Age: 84
End: 2024-12-13
Payer: MEDICARE

## 2024-12-13 VITALS
HEIGHT: 61 IN | DIASTOLIC BLOOD PRESSURE: 76 MMHG | RESPIRATION RATE: 20 BRPM | WEIGHT: 102.38 LBS | TEMPERATURE: 98 F | HEART RATE: 85 BPM | OXYGEN SATURATION: 97 % | BODY MASS INDEX: 19.33 KG/M2 | SYSTOLIC BLOOD PRESSURE: 138 MMHG

## 2024-12-13 DIAGNOSIS — R63.0 ANOREXIA: ICD-10-CM

## 2024-12-13 DIAGNOSIS — R10.13 DYSPEPSIA: Primary | ICD-10-CM

## 2024-12-13 NOTE — ASSESSMENT & PLAN NOTE
H Pylori testing per patient's request  Will call with results  Instructed to continue to monitor symptoms  Keep appt with PCP for follow up  Report to ED for any CP, SOB, and/or worsening symptoms  Pt is agreeable to plan and verbalizes understanding

## 2024-12-13 NOTE — PROGRESS NOTES
"Subjective:      Patient ID: Shilpa Dumont is a 84 y.o.  female      Chief Complaint: Anorexia (Loss of Appetite)       Past Medical History:   Diagnosis Date    Diabetes mellitus, type 2     Hyperlipidemia     Hypertension         HPI  C/O of loss of appetite; unsure of onset. Associated with dyspepsia.  Denies any unintentional weight loss, nausea, vomiting, belching, flatulence, epigastric pain, weight loss, early satiety.  Daughter is adamant about testing for H. Pylori, states it "runs in the family."  Denies any other problems.               Patient Care Team:  Melissa Philip DO as PCP - General (Family Medicine)      Review of Systems   Constitutional: Negative.  Negative for appetite change, chills and fever.   HENT: Negative.     Eyes: Negative.  Negative for discharge and redness.   Respiratory: Negative.  Negative for shortness of breath.    Cardiovascular: Negative.  Negative for chest pain.   Gastrointestinal: Negative.  Positive for reflux. Negative for abdominal distention, abdominal pain, blood in stool, change in bowel habit, constipation, diarrhea, nausea, rectal pain and vomiting.   Endocrine: Negative.    Genitourinary: Negative.    Integumentary:  Negative.   Allergic/Immunologic: Negative.    Neurological: Negative.    Psychiatric/Behavioral: Negative.     All other systems reviewed and are negative.          Objective:      Vitals:    12/13/24 0803   BP: 138/76   Pulse: 85   Resp: 20   Temp: 97.8 °F (36.6 °C)      Body mass index is 19.35 kg/m².     Physical Exam  Vitals reviewed.   Constitutional:       Appearance: Normal appearance.   HENT:      Head: Normocephalic.      Mouth/Throat:      Mouth: Mucous membranes are moist.   Eyes:      Conjunctiva/sclera: Conjunctivae normal.      Pupils: Pupils are equal, round, and reactive to light.   Cardiovascular:      Rate and Rhythm: Normal rate and regular rhythm.   Pulmonary:      Effort: Pulmonary effort is normal. No respiratory distress.    "   Breath sounds: Normal breath sounds.   Abdominal:      General: Bowel sounds are normal. There is no distension.      Palpations: Abdomen is soft.   Musculoskeletal:         General: Normal range of motion.      Cervical back: Normal range of motion and neck supple.   Skin:     General: Skin is warm and dry.   Neurological:      Mental Status: She is alert and oriented to person, place, and time.   Psychiatric:         Mood and Affect: Mood normal.            Current Outpatient Medications:     albuterol (VENTOLIN HFA) 90 mcg/actuation inhaler, Inhale 2 puffs into the lungs every 6 (six) hours as needed for Wheezing. Rescue, Disp: 18 g, Rfl: 0    amLODIPine (NORVASC) 10 MG tablet, TAKE 1 TABLET BY MOUTH EVERY DAY, Disp: 90 tablet, Rfl: 1    clotrimazole-betamethasone 1-0.05% (LOTRISONE) cream, Apply topically., Disp: , Rfl:     diclofenac sodium (VOLTAREN ARTHRITIS PAIN) 1 % Gel, Apply 2 g topically 4 (four) times daily as needed (knee pain)., Disp: 150 g, Rfl: 0    doxazosin (CARDURA) 4 MG tablet, TAKE 1 TABLET(4 MG) BY MOUTH EVERY DAY, Disp: 90 tablet, Rfl: 3    fluocinonide (LIDEX) 0.05 % external solution, SMARTSI-2 Milliliter(s) Topical Twice Daily PRN, Disp: , Rfl:     glipiZIDE (GLUCOTROL) 5 MG tablet, Take 1 tablet (5 mg total) by mouth once daily., Disp: 90 tablet, Rfl: 3    irbesartan (AVAPRO) 300 MG tablet, TAKE 1 TABLET BY MOUTH EVERY DAY, Disp: 90 tablet, Rfl: 3    ketoconazole (NIZORAL) 2 % shampoo, Apply topically., Disp: , Rfl:     rosuvastatin (CRESTOR) 20 MG tablet, Take 1 tablet (20 mg total) by mouth once daily., Disp: 90 tablet, Rfl: 3    triamcinolone acetonide 0.1% (KENALOG) 0.1 % ointment, Apply topically 3 (three) times daily., Disp: 80 g, Rfl: 3    Assessment & Plan:     Problem List Items Addressed This Visit          GI    Dyspepsia - Primary     H Pylori testing per patient's request  Will call with results  Instructed to continue to monitor symptoms  Keep appt with PCP for follow  up  Report to ED for any CP, SOB, and/or worsening symptoms  Pt is agreeable to plan and verbalizes understanding           Relevant Orders    Helicobacter Pylori Antigen Fecal EIA       Other    Anorexia     H Pylori testing per patient's request  Will call with results  Instructed to continue to monitor symptoms  Keep appt with PCP for follow up  Report to ED for any CP, SOB, and/or worsening symptoms  Pt is agreeable to plan and verbalizes understanding         Relevant Orders    Helicobacter Pylori Antigen Fecal EIA            Prior to the patient's arrival on the same day, I spent (6) minutes reviewing chart. Once in the exam room with the patient, I spent (12 ) minutes in the room with the member performing a history and exam as well as reviewing the test results and recommendations with the patient. After leaving the exam room, I spent an additional (2) minutes completing the electronic health record. The total time spent that day caring for the member is (20) minutes, and this time - including the breakdown - is documented in the medical record.

## 2024-12-16 ENCOUNTER — APPOINTMENT (OUTPATIENT)
Dept: LAB | Facility: HOSPITAL | Age: 84
End: 2024-12-16
Attending: NURSE PRACTITIONER
Payer: MEDICARE

## 2024-12-17 DIAGNOSIS — A04.8 H. PYLORI INFECTION: Primary | ICD-10-CM

## 2024-12-17 RX ORDER — CLARITHROMYCIN 500 MG/1
500 TABLET, FILM COATED ORAL EVERY 12 HOURS
Qty: 28 TABLET | Refills: 0 | Status: SHIPPED | OUTPATIENT
Start: 2024-12-17 | End: 2024-12-31

## 2024-12-17 RX ORDER — OMEPRAZOLE 20 MG/1
20 CAPSULE, DELAYED RELEASE ORAL 2 TIMES DAILY
Qty: 84 CAPSULE | Refills: 0 | Status: SHIPPED | OUTPATIENT
Start: 2024-12-17 | End: 2025-01-28

## 2024-12-17 RX ORDER — METRONIDAZOLE 500 MG/1
500 TABLET ORAL EVERY 12 HOURS
Qty: 28 TABLET | Refills: 0 | Status: SHIPPED | OUTPATIENT
Start: 2024-12-17 | End: 2024-12-31

## 2024-12-17 RX ORDER — AMOXICILLIN 500 MG/1
1000 TABLET, FILM COATED ORAL EVERY 12 HOURS
Qty: 56 TABLET | Refills: 0 | Status: SHIPPED | OUTPATIENT
Start: 2024-12-17 | End: 2024-12-31

## 2024-12-23 ENCOUNTER — TELEPHONE (OUTPATIENT)
Dept: FAMILY MEDICINE | Facility: CLINIC | Age: 84
End: 2024-12-23
Payer: MEDICARE

## 2024-12-23 NOTE — TELEPHONE ENCOUNTER
----- Message from Stevan sent at 12/23/2024  2:33 PM CST -----  Who Called: Julián Horn    Caller is requesting assistance/information from provider's office.    Symptoms (please be specific): facial swelling   How long has patient had these symptoms:    List of preferred pharmacies on file (remove unneeded): [unfilled]  If different, enter pharmacy into here including location and phone number:       Preferred Method of Contact: Phone Call  Patient's Preferred Phone Number on File: [phone]  Best Call Back Number, if different:  Additional Information: Would like a cb in regards to which medication she should she still take, pt does not now what medication is causing her face to swell.

## 2024-12-23 NOTE — TELEPHONE ENCOUNTER
----- Message from Keduo sent at 12/23/2024 11:00 AM CST -----  Who Called: Taihsa- pt daughter    Caller is requesting assistance/information from provider's office.    Symptoms (please be specific):    How long has patient had these symptoms:    List of preferred pharmacies on file (remove unneeded): [unfilled]  If different, enter pharmacy into here including location and phone number:       Patient's Preferred Phone Number on File: [phone]   Best Call Back Number, if different:    Additional Information: pt was given medication to treat H pylori, pt had allergic reaction on Thursday afternoon, pt was unable swallow  and face swelling , medication was stopped on Friday . Asked for a call back to be advised on next step

## 2024-12-23 NOTE — TELEPHONE ENCOUNTER
I spoke with patient daughter, geovanni. Patient was rx amoxicillin, clarithromycin, and flagyl for H pylori infection. After patient began having symptoms of difficulty swallowing and facial swelling on Thursday, patient immediately stopped taking meds. Did not take anything OTC for reaction. Has not taken any of the meds since Thursday. Her symptoms have gotten better.   Would you like to replace medication?

## 2024-12-24 NOTE — TELEPHONE ENCOUNTER
I spoke with Taisha, patient's daughter. She said she is going to explain this to the patient so that she understands and will call us back with the patient's consent for referral

## 2025-03-10 ENCOUNTER — RESULTS FOLLOW-UP (OUTPATIENT)
Dept: FAMILY MEDICINE | Facility: CLINIC | Age: 85
End: 2025-03-10

## 2025-03-10 ENCOUNTER — LAB VISIT (OUTPATIENT)
Dept: LAB | Facility: HOSPITAL | Age: 85
End: 2025-03-10
Attending: STUDENT IN AN ORGANIZED HEALTH CARE EDUCATION/TRAINING PROGRAM
Payer: MEDICARE

## 2025-03-10 ENCOUNTER — OFFICE VISIT (OUTPATIENT)
Dept: FAMILY MEDICINE | Facility: CLINIC | Age: 85
End: 2025-03-10
Payer: MEDICARE

## 2025-03-10 VITALS
OXYGEN SATURATION: 96 % | BODY MASS INDEX: 19.6 KG/M2 | TEMPERATURE: 98 F | WEIGHT: 103.81 LBS | HEART RATE: 92 BPM | RESPIRATION RATE: 18 BRPM | DIASTOLIC BLOOD PRESSURE: 80 MMHG | HEIGHT: 61 IN | SYSTOLIC BLOOD PRESSURE: 138 MMHG

## 2025-03-10 DIAGNOSIS — R35.89 POLYURIA: ICD-10-CM

## 2025-03-10 DIAGNOSIS — L30.9 DERMATITIS: ICD-10-CM

## 2025-03-10 DIAGNOSIS — I10 PRIMARY HYPERTENSION: ICD-10-CM

## 2025-03-10 DIAGNOSIS — E78.2 MIXED HYPERLIPIDEMIA: ICD-10-CM

## 2025-03-10 DIAGNOSIS — E11.65 TYPE 2 DIABETES MELLITUS WITH HYPERGLYCEMIA, WITHOUT LONG-TERM CURRENT USE OF INSULIN: Primary | ICD-10-CM

## 2025-03-10 DIAGNOSIS — E11.65 TYPE 2 DIABETES MELLITUS WITH HYPERGLYCEMIA, WITHOUT LONG-TERM CURRENT USE OF INSULIN: ICD-10-CM

## 2025-03-10 PROBLEM — R10.13 DYSPEPSIA: Status: RESOLVED | Noted: 2024-12-13 | Resolved: 2025-03-10

## 2025-03-10 PROBLEM — R63.0 ANOREXIA: Status: RESOLVED | Noted: 2024-12-13 | Resolved: 2025-03-10

## 2025-03-10 PROBLEM — R63.0 POOR APPETITE: Status: RESOLVED | Noted: 2024-12-13 | Resolved: 2025-03-10

## 2025-03-10 LAB
ALBUMIN SERPL-MCNC: 3.5 G/DL (ref 3.4–4.8)
ALBUMIN/GLOB SERPL: 1 RATIO (ref 1.1–2)
ALP SERPL-CCNC: 93 UNIT/L (ref 40–150)
ALT SERPL-CCNC: 25 UNIT/L (ref 0–55)
ANION GAP SERPL CALC-SCNC: 8 MEQ/L
AST SERPL-CCNC: 23 UNIT/L (ref 5–34)
BACTERIA #/AREA URNS AUTO: ABNORMAL /HPF
BASOPHILS # BLD AUTO: 0.02 X10(3)/MCL
BASOPHILS NFR BLD AUTO: 0.5 %
BILIRUB SERPL-MCNC: 0.3 MG/DL
BILIRUB UR QL STRIP.AUTO: NEGATIVE
BUN SERPL-MCNC: 32.6 MG/DL (ref 9.8–20.1)
CALCIUM SERPL-MCNC: 8.8 MG/DL (ref 8.4–10.2)
CHLORIDE SERPL-SCNC: 108 MMOL/L (ref 98–107)
CLARITY UR: CLEAR
CO2 SERPL-SCNC: 25 MMOL/L (ref 23–31)
COLOR UR AUTO: YELLOW
CREAT SERPL-MCNC: 1.37 MG/DL (ref 0.55–1.02)
CREAT/UREA NIT SERPL: 24
EOSINOPHIL # BLD AUTO: 0.23 X10(3)/MCL (ref 0–0.9)
EOSINOPHIL NFR BLD AUTO: 5.5 %
ERYTHROCYTE [DISTWIDTH] IN BLOOD BY AUTOMATED COUNT: 14.2 % (ref 11.5–17)
EST. AVERAGE GLUCOSE BLD GHB EST-MCNC: 131.2 MG/DL
GFR SERPLBLD CREATININE-BSD FMLA CKD-EPI: 38 ML/MIN/1.73/M2
GLOBULIN SER-MCNC: 3.5 GM/DL (ref 2.4–3.5)
GLUCOSE SERPL-MCNC: 185 MG/DL (ref 82–115)
GLUCOSE UR QL STRIP: NEGATIVE
HBA1C MFR BLD: 6.2 %
HCT VFR BLD AUTO: 33.7 % (ref 37–47)
HGB BLD-MCNC: 11.1 G/DL (ref 12–16)
HGB UR QL STRIP: NEGATIVE
HYALINE CASTS URNS QL MICRO: ABNORMAL /LPF
IMM GRANULOCYTES # BLD AUTO: 0 X10(3)/MCL (ref 0–0.04)
IMM GRANULOCYTES NFR BLD AUTO: 0 %
KETONES UR QL STRIP: ABNORMAL
LEUKOCYTE ESTERASE UR QL STRIP: NEGATIVE
LYMPHOCYTES # BLD AUTO: 0.86 X10(3)/MCL (ref 0.6–4.6)
LYMPHOCYTES NFR BLD AUTO: 20.6 %
MCH RBC QN AUTO: 30.6 PG (ref 27–31)
MCHC RBC AUTO-ENTMCNC: 32.9 G/DL (ref 33–36)
MCV RBC AUTO: 92.8 FL (ref 80–94)
MONOCYTES # BLD AUTO: 0.3 X10(3)/MCL (ref 0.1–1.3)
MONOCYTES NFR BLD AUTO: 7.2 %
NEUTROPHILS # BLD AUTO: 2.76 X10(3)/MCL (ref 2.1–9.2)
NEUTROPHILS NFR BLD AUTO: 66.2 %
NITRITE UR QL STRIP: NEGATIVE
NRBC BLD AUTO-RTO: 0 %
PH UR STRIP: 5.5 [PH]
PLATELET # BLD AUTO: 215 X10(3)/MCL (ref 130–400)
PMV BLD AUTO: 9.9 FL (ref 7.4–10.4)
POTASSIUM SERPL-SCNC: 4.3 MMOL/L (ref 3.5–5.1)
PROT SERPL-MCNC: 7 GM/DL (ref 5.8–7.6)
PROT UR QL STRIP: >=300
RBC # BLD AUTO: 3.63 X10(6)/MCL (ref 4.2–5.4)
RBC #/AREA URNS AUTO: ABNORMAL /HPF
SODIUM SERPL-SCNC: 141 MMOL/L (ref 136–145)
SP GR UR STRIP.AUTO: 1.02 (ref 1–1.03)
SQUAMOUS #/AREA URNS AUTO: ABNORMAL /HPF
UROBILINOGEN UR STRIP-ACNC: 0.2
WBC # BLD AUTO: 4.17 X10(3)/MCL (ref 4.5–11.5)
WBC #/AREA URNS AUTO: ABNORMAL /HPF

## 2025-03-10 PROCEDURE — 80053 COMPREHEN METABOLIC PANEL: CPT

## 2025-03-10 PROCEDURE — 85025 COMPLETE CBC W/AUTO DIFF WBC: CPT

## 2025-03-10 PROCEDURE — 81003 URINALYSIS AUTO W/O SCOPE: CPT

## 2025-03-10 PROCEDURE — 83036 HEMOGLOBIN GLYCOSYLATED A1C: CPT

## 2025-03-10 PROCEDURE — 36415 COLL VENOUS BLD VENIPUNCTURE: CPT

## 2025-03-10 RX ORDER — IRBESARTAN 300 MG/1
300 TABLET ORAL DAILY
Qty: 90 TABLET | Refills: 3 | Status: SHIPPED | OUTPATIENT
Start: 2025-03-10

## 2025-03-10 RX ORDER — ROSUVASTATIN CALCIUM 20 MG/1
20 TABLET, COATED ORAL DAILY
Qty: 90 TABLET | Refills: 3 | Status: SHIPPED | OUTPATIENT
Start: 2025-03-10

## 2025-03-10 RX ORDER — AMLODIPINE BESYLATE 10 MG/1
10 TABLET ORAL DAILY
Qty: 90 TABLET | Refills: 3 | Status: SHIPPED | OUTPATIENT
Start: 2025-03-10

## 2025-03-10 RX ORDER — DOXAZOSIN 4 MG/1
4 TABLET ORAL DAILY
Qty: 90 TABLET | Refills: 3 | Status: SHIPPED | OUTPATIENT
Start: 2025-03-10

## 2025-03-10 NOTE — PROGRESS NOTES
"Subjective:      Patient ID: Shilpa Dumont is a 85 y.o. [place] female. She's accompanied by her grandson, Mr. Julián Horn.    Chief Complaint: Chronic Medical Management    NIDDMII: Patient taking Glipizide daily. She is following with [facility]. She denies any hypoglycemic episodes.     HTN/HLD: /80. Patient states compliance with Norvasc, Doxazosin, Irbesartan, and Crestor.     Dermatitis: Patient following with Dermatology.     Preventative Health: Medicare Wellness completed on 01/08/24.    Review of Systems   Constitutional:  Negative for activity change, appetite change, chills, diaphoresis, fatigue, fever and unexpected weight change.   Eyes:  Negative for visual disturbance.   Respiratory:  Negative for apnea, cough and shortness of breath.    Cardiovascular:  Negative for chest pain, palpitations and leg swelling.   Gastrointestinal:  Negative for abdominal pain, blood in stool, constipation, diarrhea, nausea and vomiting.   Genitourinary:  Negative for dysuria and hematuria.        +frequent urination at night   Musculoskeletal:  Positive for arthralgias. Negative for back pain and myalgias.   Skin:  Negative for rash and wound.   Neurological:  Negative for dizziness, syncope, weakness, numbness and headaches.   Psychiatric/Behavioral:  Negative for behavioral problems, dysphoric mood and sleep disturbance. The patient is not nervous/anxious.      Objective:   /80 (BP Location: Right arm, Patient Position: Sitting)   Pulse 92   Temp 97.9 °F (36.6 °C) (Oral)   Resp 18   Ht 5' 0.98" (1.549 m)   Wt 47.1 kg (103 lb 12.8 oz)   SpO2 96%   BMI 19.62 kg/m²     Physical Exam  Vitals and nursing note reviewed.   Constitutional:       General: She is not in acute distress.     Appearance: Normal appearance. She is not ill-appearing or toxic-appearing.   HENT:      Head: Normocephalic and atraumatic.      Comments: Hard-of-hearing  Eyes:      General:         Right eye: No discharge.  "        Left eye: No discharge.      Conjunctiva/sclera: Conjunctivae normal.   Cardiovascular:      Rate and Rhythm: Normal rate and regular rhythm.      Heart sounds: Normal heart sounds. No murmur heard.  Pulmonary:      Effort: Pulmonary effort is normal. No respiratory distress.      Breath sounds: Normal breath sounds.   Abdominal:      General: There is no distension.      Palpations: Abdomen is soft.      Tenderness: There is no abdominal tenderness.   Musculoskeletal:         General: No swelling, tenderness or deformity.      Right lower leg: No edema.      Left lower leg: No edema.   Skin:     General: Skin is warm and dry.      Findings: No lesion or rash.   Neurological:      General: No focal deficit present.      Mental Status: She is alert. Mental status is at baseline.      Motor: Weakness (generalized) present.      Coordination: Coordination normal.   Psychiatric:         Mood and Affect: Mood normal.         Behavior: Behavior normal.         Thought Content: Thought content normal.         Judgment: Judgment normal.       Assessment/Plan:   1. Type 2 diabetes mellitus with hyperglycemia, without long-term current use of insulin  Assessment & Plan:  - Labs for routine monitoring.  - Patient following with [facility].  - Continue Glipizide 5mg daily.    Orders:  -     CBC Auto Differential; Future; Expected date: 03/10/2025  -     Comprehensive Metabolic Panel; Future; Expected date: 03/10/2025  -     Hemoglobin A1C; Future; Expected date: 03/10/2025    2. Primary hypertension  Assessment & Plan:  - BP well-controlled.  - Continue Norvasc 10mg daily, Irbesartan 300mg daily, and Doxazosin 4mg daily.    Orders:  -     CBC Auto Differential; Future; Expected date: 03/10/2025  -     Comprehensive Metabolic Panel; Future; Expected date: 03/10/2025  -     amLODIPine (NORVASC) 10 MG tablet; Take 1 tablet (10 mg total) by mouth once daily.  Dispense: 90 tablet; Refill: 3  -     doxazosin  (CARDURA) 4 MG tablet; Take 1 tablet (4 mg total) by mouth once daily.  Dispense: 90 tablet; Refill: 3  -     irbesartan (AVAPRO) 300 MG tablet; Take 1 tablet (300 mg total) by mouth once daily.  Dispense: 90 tablet; Refill: 3    3. Mixed hyperlipidemia  Assessment & Plan:  - Continue Crestor 20mg daily.    Orders:  -     rosuvastatin (CRESTOR) 20 MG tablet; Take 1 tablet (20 mg total) by mouth once daily.  Dispense: 90 tablet; Refill: 3    4. Dermatitis  Assessment & Plan:  - Stable.  - Patient following with Dermatology.      5. Polyuria  -     Urinalysis, Reflex to Urine Culture; Future; Expected date: 03/10/2025    Follow up in about 3 months (around 6/10/2025) for Medicare Wellness.

## 2025-03-10 NOTE — ASSESSMENT & PLAN NOTE
- Labs for routine monitoring.  - Patient following with [facility].  - Continue Glipizide 5mg daily.

## 2025-03-14 DIAGNOSIS — N18.32 STAGE 3B CHRONIC KIDNEY DISEASE: Primary | ICD-10-CM

## 2025-03-25 ENCOUNTER — TELEPHONE (OUTPATIENT)
Dept: FAMILY MEDICINE | Facility: CLINIC | Age: 85
End: 2025-03-25
Payer: MEDICARE

## 2025-03-25 DIAGNOSIS — A04.8 H. PYLORI INFECTION: Primary | ICD-10-CM

## 2025-03-25 NOTE — TELEPHONE ENCOUNTER
----- Message from Rubi sent at 3/24/2025  3:59 PM CDT -----  Who Called: Taisha Caller is requesting assistance/information from provider's office.Symptoms (please be specific):   How long has patient had these symptoms:   List of preferred pharmacies on file (remove unneeded): [unfilled]If different, enter pharmacy into here including location and phone number:  Preferred Method of Contact: Phone CallPatient's Preferred Phone Number on File: [phone] Best Call Back Number, if different:Additional Information:   pt daughter would like to speak with nurse about a ref

## 2025-03-25 NOTE — TELEPHONE ENCOUNTER
I spoke with patients daughter Taisha and she stated patient is requesting a referral to see GI for the h.pylori. She stated patient previously didn't want to see anyone for it and can't take the rx prescribed previously for it. She requested referral to Dr. Parish. Referral placed and sent to Dr. Parish. She also asked about nephrology referral and I gave her the number to the clinic for her to call [phone]. Ahs

## 2025-04-29 ENCOUNTER — TELEPHONE (OUTPATIENT)
Dept: NEPHROLOGY | Facility: CLINIC | Age: 85
End: 2025-04-29
Payer: MEDICARE

## 2025-04-29 DIAGNOSIS — N18.32 STAGE 3B CHRONIC KIDNEY DISEASE: Primary | ICD-10-CM

## 2025-04-29 DIAGNOSIS — E11.65 TYPE 2 DIABETES MELLITUS WITH HYPERGLYCEMIA, WITHOUT LONG-TERM CURRENT USE OF INSULIN: ICD-10-CM

## 2025-04-29 NOTE — TELEPHONE ENCOUNTER
----- Message from Stefanie sent at 4/29/2025 10:29 AM CDT -----  Regarding: Referral  Pt scheduled on 6/16 at 1:00, daughter states pt has labs about 2 mths ago.. db

## 2025-05-05 ENCOUNTER — PATIENT MESSAGE (OUTPATIENT)
Dept: NEPHROLOGY | Facility: CLINIC | Age: 85
End: 2025-05-05
Payer: MEDICARE

## 2025-05-07 ENCOUNTER — LAB VISIT (OUTPATIENT)
Dept: LAB | Facility: HOSPITAL | Age: 85
End: 2025-05-07
Attending: NURSE PRACTITIONER
Payer: MEDICARE

## 2025-05-07 DIAGNOSIS — N18.32 STAGE 3B CHRONIC KIDNEY DISEASE: ICD-10-CM

## 2025-05-07 DIAGNOSIS — E11.65 TYPE 2 DIABETES MELLITUS WITH HYPERGLYCEMIA, WITHOUT LONG-TERM CURRENT USE OF INSULIN: ICD-10-CM

## 2025-05-07 LAB
ALBUMIN SERPL-MCNC: 3.3 G/DL (ref 3.4–4.8)
ALBUMIN/GLOB SERPL: 0.9 RATIO (ref 1.1–2)
ALP SERPL-CCNC: 99 UNIT/L (ref 40–150)
ALT SERPL-CCNC: 28 UNIT/L (ref 0–55)
ANION GAP SERPL CALC-SCNC: 9 MEQ/L
AST SERPL-CCNC: 25 UNIT/L (ref 11–45)
BACTERIA #/AREA URNS AUTO: NORMAL /HPF
BASOPHILS # BLD AUTO: 0.04 X10(3)/MCL
BASOPHILS NFR BLD AUTO: 0.9 %
BILIRUB SERPL-MCNC: 0.4 MG/DL
BILIRUB UR QL STRIP.AUTO: NEGATIVE
BUN SERPL-MCNC: 31.3 MG/DL (ref 9.8–20.1)
CALCIUM SERPL-MCNC: 9.1 MG/DL (ref 8.4–10.2)
CHLORIDE SERPL-SCNC: 112 MMOL/L (ref 98–107)
CLARITY UR: CLEAR
CO2 SERPL-SCNC: 23 MMOL/L (ref 23–31)
COLOR UR AUTO: ABNORMAL
CREAT SERPL-MCNC: 1.29 MG/DL (ref 0.55–1.02)
CREAT UR-MCNC: 86.3 MG/DL (ref 45–106)
CREAT/UREA NIT SERPL: 24
EOSINOPHIL # BLD AUTO: 0.37 X10(3)/MCL (ref 0–0.9)
EOSINOPHIL NFR BLD AUTO: 8.4 %
ERYTHROCYTE [DISTWIDTH] IN BLOOD BY AUTOMATED COUNT: 14.1 % (ref 11.5–17)
GFR SERPLBLD CREATININE-BSD FMLA CKD-EPI: 41 ML/MIN/1.73/M2
GLOBULIN SER-MCNC: 3.8 GM/DL (ref 2.4–3.5)
GLUCOSE SERPL-MCNC: 167 MG/DL (ref 82–115)
GLUCOSE UR QL STRIP: NEGATIVE
HCT VFR BLD AUTO: 34.6 % (ref 37–47)
HGB BLD-MCNC: 11.5 G/DL (ref 12–16)
HGB UR QL STRIP: ABNORMAL
IMM GRANULOCYTES # BLD AUTO: 0.01 X10(3)/MCL (ref 0–0.04)
IMM GRANULOCYTES NFR BLD AUTO: 0.2 %
KETONES UR QL STRIP: NEGATIVE
LEUKOCYTE ESTERASE UR QL STRIP: NEGATIVE
LYMPHOCYTES # BLD AUTO: 1.4 X10(3)/MCL (ref 0.6–4.6)
LYMPHOCYTES NFR BLD AUTO: 31.6 %
MCH RBC QN AUTO: 30.9 PG (ref 27–31)
MCHC RBC AUTO-ENTMCNC: 33.2 G/DL (ref 33–36)
MCV RBC AUTO: 93 FL (ref 80–94)
MONOCYTES # BLD AUTO: 0.32 X10(3)/MCL (ref 0.1–1.3)
MONOCYTES NFR BLD AUTO: 7.2 %
NEUTROPHILS # BLD AUTO: 2.29 X10(3)/MCL (ref 2.1–9.2)
NEUTROPHILS NFR BLD AUTO: 51.7 %
NITRITE UR QL STRIP: NEGATIVE
NRBC BLD AUTO-RTO: 0 %
PH UR STRIP: 6 [PH]
PLATELET # BLD AUTO: 205 X10(3)/MCL (ref 130–400)
PMV BLD AUTO: 10.1 FL (ref 7.4–10.4)
POTASSIUM SERPL-SCNC: 3.9 MMOL/L (ref 3.5–5.1)
PROT SERPL-MCNC: 7.1 GM/DL (ref 5.8–7.6)
PROT UR QL STRIP: >=300
PROT UR STRIP-MCNC: 380.4 MG/DL
PTH-INTACT SERPL-MCNC: 70.5 PG/ML (ref 8.7–77)
RBC # BLD AUTO: 3.72 X10(6)/MCL (ref 4.2–5.4)
RBC #/AREA URNS AUTO: NORMAL /HPF
SODIUM SERPL-SCNC: 144 MMOL/L (ref 136–145)
SP GR UR STRIP.AUTO: 1.02 (ref 1–1.03)
SQUAMOUS #/AREA URNS AUTO: NORMAL /HPF
URINE PROTEIN/CREATININE RATIO (OLG): 4.4
UROBILINOGEN UR STRIP-ACNC: 0.2
WBC # BLD AUTO: 4.43 X10(3)/MCL (ref 4.5–11.5)
WBC #/AREA URNS AUTO: NORMAL /HPF

## 2025-05-07 PROCEDURE — 80053 COMPREHEN METABOLIC PANEL: CPT

## 2025-05-07 PROCEDURE — 82570 ASSAY OF URINE CREATININE: CPT

## 2025-05-07 PROCEDURE — 36415 COLL VENOUS BLD VENIPUNCTURE: CPT

## 2025-05-07 PROCEDURE — 85025 COMPLETE CBC W/AUTO DIFF WBC: CPT

## 2025-05-07 PROCEDURE — 83970 ASSAY OF PARATHORMONE: CPT

## 2025-05-07 PROCEDURE — 81003 URINALYSIS AUTO W/O SCOPE: CPT

## 2025-05-12 ENCOUNTER — OFFICE VISIT (OUTPATIENT)
Dept: NEPHROLOGY | Facility: CLINIC | Age: 85
End: 2025-05-12
Payer: MEDICARE

## 2025-05-12 VITALS
BODY MASS INDEX: 20.35 KG/M2 | WEIGHT: 103.63 LBS | HEIGHT: 60 IN | DIASTOLIC BLOOD PRESSURE: 79 MMHG | HEART RATE: 87 BPM | TEMPERATURE: 98 F | RESPIRATION RATE: 18 BRPM | SYSTOLIC BLOOD PRESSURE: 147 MMHG | OXYGEN SATURATION: 94 %

## 2025-05-12 DIAGNOSIS — Z87.442 HISTORY OF NEPHROLITHIASIS: ICD-10-CM

## 2025-05-12 DIAGNOSIS — R80.9 NEPHROTIC RANGE PROTEINURIA: ICD-10-CM

## 2025-05-12 DIAGNOSIS — E11.65 TYPE 2 DIABETES MELLITUS WITH HYPERGLYCEMIA, WITHOUT LONG-TERM CURRENT USE OF INSULIN: ICD-10-CM

## 2025-05-12 DIAGNOSIS — I10 PRIMARY HYPERTENSION: ICD-10-CM

## 2025-05-12 DIAGNOSIS — N18.32 STAGE 3B CHRONIC KIDNEY DISEASE: Primary | ICD-10-CM

## 2025-05-12 PROCEDURE — 99215 OFFICE O/P EST HI 40 MIN: CPT | Mod: PBBFAC | Performed by: NURSE PRACTITIONER

## 2025-05-12 PROCEDURE — 99999 PR PBB SHADOW E&M-EST. PATIENT-LVL V: CPT | Mod: PBBFAC,,, | Performed by: NURSE PRACTITIONER

## 2025-05-12 RX ORDER — LANOLIN ALCOHOL/MO/W.PET/CERES
1 CREAM (GRAM) TOPICAL DAILY
COMMUNITY

## 2025-05-12 NOTE — PROGRESS NOTES
[facility] New Referral Office Note    IDLIP Dumont, 85 y.o. female, presents to office as a new patient referred for evaluation of CKD 3B and proteinuria.  Patient's creatinine has been ranging 1.0-1.2 and then she had elevation up to 1.37 in March.  Corresponding with GFR of 38.  Today creatinine is 1.29 and GFR is 41.  However patient noted to have nephrotic range proteinuria 4.4 g on U PCR today.  On review of records her UA in the past in 2019 at 2+ protein and microalbumin creatinine ratio of 1.4g.  She is diabetic her diabetes has always been controlled with A1cs approximately 6% going back to 2019.  BP is mildly elevated today.  Her daughter Taisha translates for us.  She does say patient does not drink enough water and has a history of kidney stones.  Of note she is maintained on Avapro 300 mg daily.    Patient denies taking NSAIDs or new antibiotics. Also denies recent episode of dehydration, diarrhea, vomiting, acute illness, hospitalization, recent angiograms or exposure to IV radiocontrast.        Medical Diagnoses:   Past Medical History:   Diagnosis Date    Diabetes mellitus, type 2     H. pylori infection     Hyperlipidemia     Hypertension      Problem List[1]    Surgical History:   Past Surgical History:   Procedure Laterality Date    BACK SURGERY         Family History:   No family history on file.    Social History:   Social History     Tobacco Use    Smoking status: Never    Smokeless tobacco: Never   Substance Use Topics    Alcohol use: Never       Allergies:  Review of patient's allergies indicates:  No Known Allergies    Medications:  Outpatient Medications Marked as Taking for the 5/12/25 encounter (Office Visit) with Sanjuana Nuno ACNP   Medication Sig Dispense Refill    amLODIPine (NORVASC) 10 MG tablet Take 1 tablet (10 mg total) by mouth once daily. 90 tablet 3    doxazosin (CARDURA) 4 MG tablet Take 1 tablet (4 mg total) by mouth once daily. 90 tablet 3    ferrous sulfate  (FEOSOL) Tab tablet Take 1 tablet by mouth once daily.      glipiZIDE (GLUCOTROL) 5 MG tablet Take 1 tablet (5 mg total) by mouth once daily. 90 tablet 3    irbesartan (AVAPRO) 300 MG tablet Take 1 tablet (300 mg total) by mouth once daily. 90 tablet 3    rosuvastatin (CRESTOR) 20 MG tablet Take 1 tablet (20 mg total) by mouth once daily. 90 tablet 3         Review of Systems:    Constitutional: Denies fever, fatigue, generalized weakness  Skin: Denies wounds, no rashes, no itching, no new skin lesions  Respiratory:  Denies cough, shortness of breath, or wheezing  Cardiovascular: Denies chest pain, palpitations, or swelling  Gastrointestional: Denies abdominal pain, nausea, vomiting, diarrhea, or constipation  Genitourinary: Denies dysuria, hematuria, foamy urine, or incontinence; reports able to empty bladder  Musculoskeletal: Denies back or flank pain  Neurological: Denies headaches, dizziness, paresthesias, tremors or focal weakness      Vital Signs:  BP (!) 147/79 (BP Location: Left arm, Patient Position: Sitting)   Pulse 87   Temp 98.2 °F (36.8 °C) (Oral)   Resp 18   Ht 5' (1.524 m)   Wt 47 kg (103 lb 9.6 oz)   SpO2 (!) 94%   BMI 20.23 kg/m²   Body mass index is 20.23 kg/m².      Physical Exam:    General: no acute distress, awake, alert  Eyes: PERRLA, conjunctiva clear, eyelids without swelling  HENT: atraumatic, oropharynx and nasal mucosa patent  Neck: supple, trache midline, full ROM, no JVD, no thyromegaly or lymphadenopathy  Respiratory: equal, unlabored, clear to auscultation A/P  Cardiovascular: RRR without murmur or rub  Edema: none  Gastrointestinal: soft, non-tender, non-distended; positive bowel sounds; no masses to palpation; no ascites  Genitourinary: no CVA tenderness upon palpation  Musculoskeletal: ROM without new limitation or discomfort  Integumentary: warm, dry; no rashes, wounds, or skin lesions  Neurological: oriented x4, appropriate, no acute deficits; no asterixis      Labs:         Component Value Date/Time     05/07/2025 0706     03/10/2025 1417    K 3.9 05/07/2025 0706    K 4.3 03/10/2025 1417     (H) 05/07/2025 0706     (H) 03/10/2025 1417    CO2 23 05/07/2025 0706    CO2 25 03/10/2025 1417    BUN 31.3 (H) 05/07/2025 0706    BUN 32.6 (H) 03/10/2025 1417    CREATININE 1.29 (H) 05/07/2025 0706    CREATININE 1.37 (H) 03/10/2025 1417    CREATININE 1.25 (H) 10/21/2024 0813    CREATININE 1.03 (H) 04/10/2024 0707    CALCIUM 9.1 05/07/2025 0706    CALCIUM 8.8 03/10/2025 1417    PTH 70.5 05/07/2025 0706           Component Value Date/Time    WBC 4.43 (L) 05/07/2025 0706    WBC 4.17 (L) 03/10/2025 1417    HGB 11.5 (L) 05/07/2025 0706    HGB 11.1 (L) 03/10/2025 1417    HCT 34.6 (L) 05/07/2025 0706    HCT 33.7 (L) 03/10/2025 1417     05/07/2025 0706     03/10/2025 1417         Imaging:  Retroperitoneal US:      Impression:    1. Stage 3b chronic kidney disease    2. Type 2 diabetes mellitus with hyperglycemia, without long-term current use of insulin    3. Nephrotic range proteinuria    4. Primary hypertension    5. History of nephrolithiasis      CKD 3 B likely age-related component and nephrosclerosis.  Possible overestimation GFR.  Patient is diabetic however her A1cs have all been controlled over the last several years.  Nephrotic range proteinuria with 4.4 g on U PCR.  Patient is maintained on Avapro.  BP mildly elevated today.  History of nephrolithiasis.      Plan:  Continue Avapro.  Discussed in detail with her daughter likely chronic issue however we will rule out other etiologies.  Check serologies.  Renal ultrasound with next visit  24 hour urine test  Avoid NSAIDs (Aleve, Mobic, Celebrex, Ibuprofen, Advil, Toradol and Diclofenac).  Only take tylenol occasionally if needed for aches/pains.  Increase water intake    Labs in 2 weeks.  Follow up in 3 weeks     Patient to call our office with any concerns prior to next appointment.    Avoid NSAIDs (Aleve,  Mobic, Celebrex, Ibuprofen, Advil, Toradol and Diclofenac).  Only take tylenol occasionally if needed for aches/pains.    Educated on low sodium diet:  Avoid high salt foods (olives, pickles, smoked meats, deli meats, salted potato chips, etc.).   Do not add salt to your food at the table.   Use only small amounts of salt when cooking.    Recommended Daily Protein Intake for chronic kidney disease:  0.8 g/kg    Avoid excessive intake of high potassium foods  Bananas, oranges, watermelon, tomatoes, potatoes, or salt substitutes    Sanjuana Borrego-BC        This note was created with the assistance of [employer] voice recognition software or phone dictation. There may be transcription errors as a result of using this technology however minimal. Effort has been made to assure accuracy of transcription but any obvious errors or omissions should be clarified with the author of the document.         [1]   Patient Active Problem List  Diagnosis    Type 2 diabetes mellitus    Hypertension    Hyperlipidemia    Dermatitis

## 2025-05-26 ENCOUNTER — HOSPITAL ENCOUNTER (OUTPATIENT)
Dept: RADIOLOGY | Facility: HOSPITAL | Age: 85
Discharge: HOME | End: 2025-05-26
Attending: NURSE PRACTITIONER
Payer: MEDICARE

## 2025-05-26 DIAGNOSIS — R80.9 NEPHROTIC RANGE PROTEINURIA: ICD-10-CM

## 2025-05-26 DIAGNOSIS — N18.32 STAGE 3B CHRONIC KIDNEY DISEASE: ICD-10-CM

## 2025-05-26 DIAGNOSIS — I10 PRIMARY HYPERTENSION: ICD-10-CM

## 2025-05-26 DIAGNOSIS — E11.65 TYPE 2 DIABETES MELLITUS WITH HYPERGLYCEMIA, WITHOUT LONG-TERM CURRENT USE OF INSULIN: ICD-10-CM

## 2025-05-26 PROCEDURE — 76770 US EXAM ABDO BACK WALL COMP: CPT | Mod: TC

## 2025-06-03 ENCOUNTER — OFFICE VISIT (OUTPATIENT)
Dept: NEPHROLOGY | Facility: CLINIC | Age: 85
End: 2025-06-03
Payer: MEDICARE

## 2025-06-03 VITALS
WEIGHT: 101.19 LBS | SYSTOLIC BLOOD PRESSURE: 162 MMHG | DIASTOLIC BLOOD PRESSURE: 81 MMHG | TEMPERATURE: 99 F | RESPIRATION RATE: 18 BRPM | HEART RATE: 77 BPM | HEIGHT: 60 IN | BODY MASS INDEX: 19.87 KG/M2 | OXYGEN SATURATION: 98 %

## 2025-06-03 DIAGNOSIS — R80.1 PERSISTENT PROTEINURIA: ICD-10-CM

## 2025-06-03 DIAGNOSIS — I10 PRIMARY HYPERTENSION: ICD-10-CM

## 2025-06-03 DIAGNOSIS — N18.32 STAGE 3B CHRONIC KIDNEY DISEASE: Primary | ICD-10-CM

## 2025-06-03 DIAGNOSIS — E11.65 TYPE 2 DIABETES MELLITUS WITH HYPERGLYCEMIA, WITHOUT LONG-TERM CURRENT USE OF INSULIN: ICD-10-CM

## 2025-06-03 DIAGNOSIS — Z87.442 HISTORY OF NEPHROLITHIASIS: ICD-10-CM

## 2025-06-03 PROCEDURE — 99999 PR PBB SHADOW E&M-EST. PATIENT-LVL V: CPT | Mod: PBBFAC,,, | Performed by: INTERNAL MEDICINE

## 2025-06-03 PROCEDURE — 99214 OFFICE O/P EST MOD 30 MIN: CPT | Mod: S$PBB,,, | Performed by: INTERNAL MEDICINE

## 2025-06-03 PROCEDURE — G2211 COMPLEX E/M VISIT ADD ON: HCPCS | Mod: S$PBB,,, | Performed by: INTERNAL MEDICINE

## 2025-06-03 PROCEDURE — 99215 OFFICE O/P EST HI 40 MIN: CPT | Mod: PBBFAC | Performed by: INTERNAL MEDICINE

## 2025-06-03 RX ORDER — DOCUSATE SODIUM 100 MG/1
100 CAPSULE, LIQUID FILLED ORAL 2 TIMES DAILY PRN
Qty: 60 CAPSULE | Refills: 6 | Status: SHIPPED | OUTPATIENT
Start: 2025-06-03

## 2025-06-10 ENCOUNTER — OFFICE VISIT (OUTPATIENT)
Dept: FAMILY MEDICINE | Facility: CLINIC | Age: 85
End: 2025-06-10
Payer: MEDICARE

## 2025-06-10 VITALS
SYSTOLIC BLOOD PRESSURE: 132 MMHG | HEIGHT: 60 IN | RESPIRATION RATE: 18 BRPM | BODY MASS INDEX: 20.39 KG/M2 | TEMPERATURE: 98 F | HEART RATE: 88 BPM | OXYGEN SATURATION: 96 % | WEIGHT: 103.88 LBS | DIASTOLIC BLOOD PRESSURE: 80 MMHG

## 2025-06-10 DIAGNOSIS — L30.9 DERMATITIS: ICD-10-CM

## 2025-06-10 DIAGNOSIS — N18.32 STAGE 3B CHRONIC KIDNEY DISEASE: ICD-10-CM

## 2025-06-10 DIAGNOSIS — Z00.00 MEDICARE ANNUAL WELLNESS VISIT, SUBSEQUENT: Primary | ICD-10-CM

## 2025-06-10 DIAGNOSIS — I10 PRIMARY HYPERTENSION: ICD-10-CM

## 2025-06-10 DIAGNOSIS — E78.2 MIXED HYPERLIPIDEMIA: ICD-10-CM

## 2025-06-10 DIAGNOSIS — E11.65 TYPE 2 DIABETES MELLITUS WITH HYPERGLYCEMIA, WITHOUT LONG-TERM CURRENT USE OF INSULIN: ICD-10-CM

## 2025-06-10 PROBLEM — Z87.442 HISTORY OF NEPHROLITHIASIS: Status: RESOLVED | Noted: 2025-06-03 | Resolved: 2025-06-10

## 2025-06-10 NOTE — PROGRESS NOTES
Family Medicine      Patient ID: [id number]     Chief Complaint: Medicare Annual Wellness     HPI:     Shilpa Dumont is a 85 y.o. Mong[place] female here today for a Medicare Annual Wellness visit and comprehensive Health Risk Assessment. She is accompanied by her son-in-law, Mr. Grace.     Preventative Health: Patient not amenable to pneumonia vaccine or DEXA.    NIDDMII: A1c 6.2 from 03/10/25. Patient taking Glipizide daily. She is following with [facility]. She denies any hypoglycemic episodes.     HTN/HLD: /80. Patient states compliance with Norvasc, Doxazosin, Irbesartan, and Crestor.     Dermatitis: Patient following with Dermatology.    Health Maintenance         Date Due Completion Date    Diabetic Eye Exam Never done ---    TETANUS VACCINE Never done ---    Pneumococcal Vaccines (Age 50+) (1 of 2 - PCV) Never done ---    Shingles Vaccine (1 of 2) Never done ---    RSV Vaccine (Age 60+ and Pregnant patients) (1 - 1-dose 75+ series) Never done ---    DEXA Scan 08/26/2022 8/26/2019    COVID-19 Vaccine (6 - 2024-25 season) 09/01/2024 11/18/2022    Hemoglobin A1c 09/10/2025 3/10/2025    Diabetes Urine Screening 10/21/2025 10/21/2024    Lipid Panel 10/21/2025 10/21/2024             Past Medical History:   Diagnosis Date    Diabetes mellitus, type 2     H. pylori infection     Hyperlipidemia     Hypertension         Past Surgical History:   Procedure Laterality Date    BACK SURGERY      EYE SURGERY  2024    Catiract        Social History     Socioeconomic History    Marital status:    Occupational History    Occupation: retired   Tobacco Use    Smoking status: Never    Smokeless tobacco: Never   Substance and Sexual Activity    Alcohol use: Never    Drug use: Never    Sexual activity: Not Currently     Partners: Male     Birth control/protection: None     Social Drivers of Health     Financial Resource Strain: Low Risk  (10/14/2024)    Overall Financial Resource Strain (CARDIA)     Difficulty  of Paying Living Expenses: Not hard at all   Food Insecurity: No Food Insecurity (10/14/2024)    Hunger Vital Sign     Worried About Running Out of Food in the Last Year: Never true     Ran Out of Food in the Last Year: Never true   Transportation Needs: No Transportation Needs (10/14/2024)    TRANSPORTATION NEEDS     Transportation : No   Physical Activity: Insufficiently Active (10/14/2024)    Exercise Vital Sign     Days of Exercise per Week: 3 days     Minutes of Exercise per Session: 30 min   Stress: No Stress Concern Present (10/14/2024)    [place] [employer] - Occupational Stress Questionnaire     Feeling of Stress : Not at all   Housing Stability: Unknown (10/14/2024)    Housing Stability Vital Sign     Unable to Pay for Housing in the Last Year: No     Homeless in the Last Year: No        Family History   Problem Relation Name Age of Onset    Cancer Daughter Taisha baez     Cancer Daughter Christie martinez         Current Outpatient Medications   Medication Instructions    amLODIPine (NORVASC) 10 mg, Oral, Daily    docusate sodium (COLACE) 100 mg, Oral, 2 times daily PRN    doxazosin (CARDURA) 4 mg, Oral, Daily    glipiZIDE (GLUCOTROL) 5 mg, Oral, Daily    irbesartan (AVAPRO) 300 mg, Oral, Daily    rosuvastatin (CRESTOR) 20 mg, Oral, Daily       Review of patient's allergies indicates:   Allergen Reactions    Omeprazole         Immunization History   Administered Date(s) Administered    COVID-19, MRNA, LN-S, PF (Pfizer) (Gray Cap) 04/02/2022    COVID-19, MRNA, LN-S, PF (Pfizer) (Purple Cap) 01/22/2021, 02/12/2021, 09/28/2021    COVID-19, mRNA, LNP-S, bivalent booster, PF (PFIZER OMICRON) 11/18/2022    Influenza 11/18/2005, 09/28/2010, 10/04/2012    Influenza - Quadrivalent - PF *Preferred* (6 months and older) 09/16/2019, 10/19/2020    Influenza - Trivalent - Afluria, Fluzone MDV 11/18/2005    Influenza - Trivalent - Fluad - Adjuvanted - PF (65 years and older 10/09/2024    Influenza -  Trivalent - Fluarix, Flulaval, Fluzone, Afluria - PF 11/18/2005, 10/04/2011, 10/04/2012        Patient Care Team:  Melissa Philip DO as PCP - General (Family Medicine)  Saul Rodriguez MD as Consulting Physician (Nephrology)  Sanjuana Nuno, ACNP as Nurse Practitioner (Nephrology)  Chico Lazo FNP as Nurse Practitioner (Nephrology)  Johanne Mckeon DO as Consulting Physician (Nephrology)    Subjective:     Review of Systems   Constitutional:  Negative for activity change, appetite change, chills, diaphoresis, fatigue, fever and unexpected weight change.   Eyes:  Negative for visual disturbance.   Respiratory:  Negative for apnea, cough and shortness of breath.    Cardiovascular:  Negative for chest pain, palpitations and leg swelling.   Gastrointestinal:  Negative for abdominal pain, blood in stool, constipation, diarrhea, nausea and vomiting.   Genitourinary:  Negative for dysuria and hematuria.        +frequent urination at night   Musculoskeletal:  Positive for arthralgias. Negative for back pain and myalgias.   Skin:  Negative for rash and wound.   Neurological:  Negative for dizziness, syncope, weakness, numbness and headaches.   Psychiatric/Behavioral:  Negative for behavioral problems, dysphoric mood and sleep disturbance. The patient is not nervous/anxious.      Objective:     Visit Vitals  /80 (BP Location: Left arm, Patient Position: Sitting)   Pulse 88   Temp 98.2 °F (36.8 °C) (Oral)   Resp 18   Ht 5' (1.524 m)   Wt 47.1 kg (103 lb 14.4 oz)   SpO2 96%   BMI 20.29 kg/m²       Physical Exam  Vitals and nursing note reviewed.   Constitutional:       General: She is not in acute distress.     Appearance: Normal appearance. She is not ill-appearing or toxic-appearing.   HENT:      Head: Normocephalic and atraumatic.      Comments: Hard-of-hearing     Mouth/Throat:      Mouth: Mucous membranes are moist.      Pharynx: Oropharynx is clear.   Eyes:      General:         Right eye: No  discharge.         Left eye: No discharge.      Conjunctiva/sclera: Conjunctivae normal.   Cardiovascular:      Rate and Rhythm: Normal rate and regular rhythm.      Heart sounds: Normal heart sounds. No murmur heard.  Pulmonary:      Effort: Pulmonary effort is normal. No respiratory distress.      Breath sounds: Normal breath sounds.   Abdominal:      General: There is no distension.      Palpations: Abdomen is soft.      Tenderness: There is no abdominal tenderness.   Musculoskeletal:         General: No swelling, tenderness or deformity.      Cervical back: Neck supple. No tenderness.      Right lower leg: No edema.      Left lower leg: No edema.   Lymphadenopathy:      Cervical: No cervical adenopathy.   Skin:     General: Skin is warm and dry.      Findings: No lesion or rash.   Neurological:      General: No focal deficit present.      Mental Status: She is alert. Mental status is at baseline.      Motor: Weakness (generalized) present.      Coordination: Coordination normal.   Psychiatric:         Mood and Affect: Mood normal.         Behavior: Behavior normal.         Thought Content: Thought content normal.         Judgment: Judgment normal.         Assessment:       ICD-10-CM ICD-9-CM   1. Medicare annual wellness visit, subsequent  Z00.00 V70.0   2. Type 2 diabetes mellitus with hyperglycemia, without long-term current use of insulin  E11.65 250.00     790.29   3. Primary hypertension  I10 401.9   4. Mixed hyperlipidemia  E78.2 272.2   5. Stage 3b chronic kidney disease  N18.32 585.3   6. Dermatitis  L30.9 692.9        Plan:     1. Medicare annual wellness visit, subsequent  Comments:  - Health maintenance reviewed.    2. Type 2 diabetes mellitus with hyperglycemia, without long-term current use of insulin  Assessment & Plan:  - Labs for routine monitoring.  - Patient following with [facility].  - Continue Glipizide 5mg daily.    Orders:  -     Hemoglobin A1C; Future; Expected date:  06/10/2025    3. Primary hypertension  Assessment & Plan:  - BP well-controlled.  - Continue Norvasc 10mg daily, Irbesartan 300mg daily, and Doxazosin 4mg daily.      4. Mixed hyperlipidemia  Assessment & Plan:  - Continue Crestor 20mg daily.      5. Stage 3b chronic kidney disease  Assessment & Plan:  - Stable.  - Patient following with Nephrology.      6. Dermatitis  Assessment & Plan:  - Stable.  - Patient following with Dermatology.           A comprehensive HEALTH RISK ASSESSMENT was completed today. Results are summarized below:    There are NO EMOTIONAL/SOCIAL CONCERNS identified on today's screening for Social Isolation, Depression and Anxiety.    There are NO COGNITIVE FUNCTION CONCERNS identified on today's screening.  There are NO FUNCTIONAL OR SAFETY CONCERNS were identified on today's screening for Physical Symptoms, Nutritional, Cognitive Function, Home Safety/Living Situation, Fall Risk, Activities of Daily Living, Independent Activities of Daily Living, Physical Activity, Timed Up and Go test and Whisper test.   The patient reports NO OPIOID PRESCRIPTIONS. This was confirmed through medication reconciliation.    The patient is NOT A TOBACCO USER.  The patient reports NO SIGNIFICANT ALCOHOL USE.     All Questions regarding food, transportation or housing were not answered today.    The patient was asked and declined the use of a free .    Advance Care Planning     Date: 06/10/2025    ACP Reviewed/No Changes  Voluntary advance care planning discussion had today with patient. Education/information provided with patient; she will review this at her convenience.      A total of 1 min was spent on advance care planning, goals of care discussion, emotional support, formulating and communicating prognosis and exploring burden/benefit of various approaches of treatment. This discussion occurred on a fully voluntary basis with the verbal consent of the patient and/or family.       Recommendations  were developed using the USPSTF age appropriate recommendations. Education, counseling, and referrals were provided as needed.    Follow up in about 3 months (around 9/10/2025) for Chronic Medical Management. In addition to their scheduled follow up, the patient has also been instructed to follow up on as needed basis.     Future Appointments   Date Time Provider Department Center   7/8/2025  9:00 AM Johanne Mckeon DO [facility] MIMI NGUYEN, DO

## 2025-06-10 NOTE — ASSESSMENT & PLAN NOTE
- Labs for routine monitoring.  - Patient following with [facility].  - Continue Glipizide 5mg daily.

## 2025-07-01 ENCOUNTER — PATIENT MESSAGE (OUTPATIENT)
Dept: NEPHROLOGY | Facility: CLINIC | Age: 85
End: 2025-07-01
Payer: MEDICARE

## 2025-07-02 ENCOUNTER — LAB VISIT (OUTPATIENT)
Dept: LAB | Facility: HOSPITAL | Age: 85
End: 2025-07-02
Attending: INTERNAL MEDICINE
Payer: MEDICARE

## 2025-07-02 DIAGNOSIS — N18.32 STAGE 3B CHRONIC KIDNEY DISEASE: ICD-10-CM

## 2025-07-02 DIAGNOSIS — Z11.3 ENCOUNTER FOR SCREENING FOR INFECTIONS WITH A PREDOMINANTLY SEXUAL MODE OF TRANSMISSION: ICD-10-CM

## 2025-07-02 DIAGNOSIS — I10 PRIMARY HYPERTENSION: ICD-10-CM

## 2025-07-02 DIAGNOSIS — R80.1 PERSISTENT PROTEINURIA: ICD-10-CM

## 2025-07-02 DIAGNOSIS — E11.65 TYPE 2 DIABETES MELLITUS WITH HYPERGLYCEMIA, WITHOUT LONG-TERM CURRENT USE OF INSULIN: Primary | ICD-10-CM

## 2025-07-02 DIAGNOSIS — R35.89 POLYURIA: ICD-10-CM

## 2025-07-02 LAB
25(OH)D3+25(OH)D2 SERPL-MCNC: 33 NG/ML (ref 30–80)
ALBUMIN SERPL-MCNC: 3.4 G/DL (ref 3.4–4.8)
ALBUMIN/GLOB SERPL: 0.8 RATIO (ref 1.1–2)
ALP SERPL-CCNC: 90 UNIT/L (ref 40–150)
ALT SERPL-CCNC: 22 UNIT/L (ref 0–55)
ANION GAP SERPL CALC-SCNC: 7 MEQ/L
AST SERPL-CCNC: 22 UNIT/L (ref 11–45)
BACTERIA #/AREA URNS AUTO: NORMAL /HPF
BASOPHILS # BLD AUTO: 0.04 X10(3)/MCL
BASOPHILS NFR BLD AUTO: 0.9 %
BILIRUB SERPL-MCNC: 0.4 MG/DL
BILIRUB UR QL STRIP.AUTO: NEGATIVE
BUN SERPL-MCNC: 35.2 MG/DL (ref 9.8–20.1)
CALCIUM SERPL-MCNC: 9.2 MG/DL (ref 8.4–10.2)
CHLORIDE SERPL-SCNC: 111 MMOL/L (ref 98–107)
CLARITY UR: CLEAR
CO2 SERPL-SCNC: 25 MMOL/L (ref 23–31)
COLOR UR AUTO: ABNORMAL
CREAT SERPL-MCNC: 1.35 MG/DL (ref 0.55–1.02)
CREAT UR-MCNC: 78.9 MG/DL (ref 45–106)
CREAT/UREA NIT SERPL: 26
EOSINOPHIL # BLD AUTO: 0.4 X10(3)/MCL (ref 0–0.9)
EOSINOPHIL NFR BLD AUTO: 8.9 %
ERYTHROCYTE [DISTWIDTH] IN BLOOD BY AUTOMATED COUNT: 14 % (ref 11.5–17)
EST. AVERAGE GLUCOSE BLD GHB EST-MCNC: 134.1 MG/DL
GFR SERPLBLD CREATININE-BSD FMLA CKD-EPI: 39 ML/MIN/1.73/M2
GLOBULIN SER-MCNC: 4.1 GM/DL (ref 2.4–3.5)
GLUCOSE SERPL-MCNC: 164 MG/DL (ref 82–115)
GLUCOSE UR QL STRIP: NEGATIVE
HBA1C MFR BLD: 6.3 %
HCT VFR BLD AUTO: 35.5 % (ref 37–47)
HGB BLD-MCNC: 11.4 G/DL (ref 12–16)
HGB UR QL STRIP: ABNORMAL
HYALINE CASTS URNS QL MICRO: NORMAL /LPF
IMM GRANULOCYTES # BLD AUTO: 0.01 X10(3)/MCL (ref 0–0.04)
IMM GRANULOCYTES NFR BLD AUTO: 0.2 %
KETONES UR QL STRIP: NEGATIVE
LEUKOCYTE ESTERASE UR QL STRIP: NEGATIVE
LYMPHOCYTES # BLD AUTO: 1.4 X10(3)/MCL (ref 0.6–4.6)
LYMPHOCYTES NFR BLD AUTO: 31.3 %
MAGNESIUM SERPL-MCNC: 2.6 MG/DL (ref 1.6–2.6)
MCH RBC QN AUTO: 30.3 PG (ref 27–31)
MCHC RBC AUTO-ENTMCNC: 32.1 G/DL (ref 33–36)
MCV RBC AUTO: 94.4 FL (ref 80–94)
MONOCYTES # BLD AUTO: 0.31 X10(3)/MCL (ref 0.1–1.3)
MONOCYTES NFR BLD AUTO: 6.9 %
NEUTROPHILS # BLD AUTO: 2.31 X10(3)/MCL (ref 2.1–9.2)
NEUTROPHILS NFR BLD AUTO: 51.8 %
NITRITE UR QL STRIP: NEGATIVE
NRBC BLD AUTO-RTO: 0 %
PH UR STRIP: 6 [PH]
PHOSPHATE SERPL-MCNC: 3.9 MG/DL (ref 2.3–4.7)
PLATELET # BLD AUTO: 230 X10(3)/MCL (ref 130–400)
PMV BLD AUTO: 10.1 FL (ref 7.4–10.4)
POTASSIUM SERPL-SCNC: 4.5 MMOL/L (ref 3.5–5.1)
PROT SERPL-MCNC: 7.5 GM/DL (ref 5.8–7.6)
PROT UR QL STRIP: >=300
PROT UR STRIP-MCNC: 311.8 MG/DL
PTH-INTACT SERPL-MCNC: 75.1 PG/ML (ref 8.7–77)
RBC # BLD AUTO: 3.76 X10(6)/MCL (ref 4.2–5.4)
RBC #/AREA URNS AUTO: NORMAL /HPF
SODIUM SERPL-SCNC: 143 MMOL/L (ref 136–145)
SP GR UR STRIP.AUTO: 1.02 (ref 1–1.03)
SQUAMOUS #/AREA URNS AUTO: NORMAL /HPF
T PALLIDUM AB SER QL: NONREACTIVE
URINE PROTEIN/CREATININE RATIO (OLG): 4
UROBILINOGEN UR STRIP-ACNC: 0.2
WBC # BLD AUTO: 4.47 X10(3)/MCL (ref 4.5–11.5)
WBC #/AREA URNS AUTO: NORMAL /HPF

## 2025-07-02 PROCEDURE — 83735 ASSAY OF MAGNESIUM: CPT

## 2025-07-02 PROCEDURE — 86255 FLUORESCENT ANTIBODY SCREEN: CPT | Mod: 91

## 2025-07-02 PROCEDURE — 83036 HEMOGLOBIN GLYCOSYLATED A1C: CPT

## 2025-07-02 PROCEDURE — 83520 IMMUNOASSAY QUANT NOS NONAB: CPT

## 2025-07-02 PROCEDURE — 82306 VITAMIN D 25 HYDROXY: CPT

## 2025-07-02 PROCEDURE — 81003 URINALYSIS AUTO W/O SCOPE: CPT

## 2025-07-02 PROCEDURE — 86255 FLUORESCENT ANTIBODY SCREEN: CPT | Mod: 59

## 2025-07-02 PROCEDURE — 83970 ASSAY OF PARATHORMONE: CPT

## 2025-07-02 PROCEDURE — 86255 FLUORESCENT ANTIBODY SCREEN: CPT

## 2025-07-02 PROCEDURE — 80053 COMPREHEN METABOLIC PANEL: CPT

## 2025-07-02 PROCEDURE — 85025 COMPLETE CBC W/AUTO DIFF WBC: CPT

## 2025-07-02 PROCEDURE — 36415 COLL VENOUS BLD VENIPUNCTURE: CPT

## 2025-07-02 PROCEDURE — 84100 ASSAY OF PHOSPHORUS: CPT

## 2025-07-02 PROCEDURE — 86780 TREPONEMA PALLIDUM: CPT

## 2025-07-02 PROCEDURE — 82570 ASSAY OF URINE CREATININE: CPT

## 2025-07-07 LAB
PLA2R IGG SER IA-ACNC: <2 RU/ML
PLA2R IGG SERPL QL IF: NEGATIVE
THSD7A IGG SERPL QL IF: NEGATIVE
THSD7A IGG SERPL QL IF: NEGATIVE

## 2025-07-08 ENCOUNTER — OFFICE VISIT (OUTPATIENT)
Dept: NEPHROLOGY | Facility: CLINIC | Age: 85
End: 2025-07-08
Payer: MEDICARE

## 2025-07-08 VITALS
BODY MASS INDEX: 20.46 KG/M2 | TEMPERATURE: 98 F | WEIGHT: 104.19 LBS | SYSTOLIC BLOOD PRESSURE: 130 MMHG | DIASTOLIC BLOOD PRESSURE: 78 MMHG | HEIGHT: 60 IN | HEART RATE: 90 BPM | RESPIRATION RATE: 19 BRPM | OXYGEN SATURATION: 97 %

## 2025-07-08 DIAGNOSIS — R80.1 PERSISTENT PROTEINURIA: ICD-10-CM

## 2025-07-08 DIAGNOSIS — I10 PRIMARY HYPERTENSION: ICD-10-CM

## 2025-07-08 DIAGNOSIS — N18.32 STAGE 3B CHRONIC KIDNEY DISEASE: Primary | ICD-10-CM

## 2025-07-08 DIAGNOSIS — E11.65 TYPE 2 DIABETES MELLITUS WITH HYPERGLYCEMIA, WITHOUT LONG-TERM CURRENT USE OF INSULIN: ICD-10-CM

## 2025-07-08 PROCEDURE — 99215 OFFICE O/P EST HI 40 MIN: CPT | Mod: S$PBB,,, | Performed by: INTERNAL MEDICINE

## 2025-07-08 PROCEDURE — 99999 PR PBB SHADOW E&M-EST. PATIENT-LVL IV: CPT | Mod: PBBFAC,,, | Performed by: INTERNAL MEDICINE

## 2025-07-08 PROCEDURE — 99214 OFFICE O/P EST MOD 30 MIN: CPT | Mod: PBBFAC | Performed by: INTERNAL MEDICINE

## 2025-07-08 RX ORDER — HYDROCHLOROTHIAZIDE 12.5 MG/1
12.5 TABLET ORAL DAILY
Qty: 30 TABLET | Refills: 11 | Status: SHIPPED | OUTPATIENT
Start: 2025-07-08 | End: 2026-07-08

## 2025-07-08 NOTE — PATIENT INSTRUCTIONS
"Start hydrochlorothiazide 12.5 mg daily  Do labs in 2 weeks    Please bring in your blood pressure log to next office visit for review  Please bring in your blood pressure machine to next office visit to make sure it is accurate  Please bring in your non-refrigerated medications to next office visit for review    Blood pressure goal: consistently less than 140/90. Not less than 100/60. If it is lower than 100/60, call me or primary doctor to let us know. If she is having dizziness, weakness, new visual changes, trouble speaking, trouble moving, go to the ER.    AC1 goal for diabetics: less than 8%    Avoid NSAIDs and COX2 inhibitors: Advil (ibuprofen), Aleve (naproxen), Mobic (meloxicam), Celebrex (celecoxib), Toradol (ketorolac) and Diclofenac (voltaren), Indomethacin (indocin).    Only take tylenol (acetaminophen) occasionally if needed for aches/pains.    Stay well hydrated with water: goal is around 64 ounces per day of pure water (not tea, coffee, soda, etc...)    Recommend low sodium diet:  Less than 2,000 mg per day  Avoid high salt foods (olives, pickles, smoked meats, deli meats, chips, fast food, etc.)   Do not add salt to your food at the table  Use only small amounts of salt when cooking    Recommend a healthy lifestyle for weight management:  Light to moderate exercise, increasing as tolerated  Low carbohydrate, low fat diet  Portion control  Weight loss can help slow kidney disease progression     Avoid alcohol, soda, and energy drinks. Limit tea and coffee.     Avoid excessive intake of high potassium foods:  Bananas, oranges, melons, tomatoes, potatoes, avocados, or salt substitutes ("low sodium seasonings")    Call our office with concerns prior to next appointment.    ---------------------------------------------------------------------------------------------------------------------------------    CHRONIC KIDNEY DISEASE BASIC INFORMATION:    Your kidneys do many important jobs. Some of the ways " they keep your whole body in balance include:  Removing natural waste products and extra water from your body  Helping make red blood cells  Balancing important minerals in your body  Helping maintain your blood pressure  Keeping your bones healthy    Chronic kidney disease (CKD) is when the kidneys have become damaged over time (for at least 3 months) and have a hard time doing all their important jobs. CKD also increases the risk of other health problems like heart disease and stroke. Developing CKD is usually a very slow process with very few symptoms at first.    Risk Factors  Anyone can develop CKD - at any age. However, some people are at a higher risk than others. The most common CKD risk factors are:  Diabetes  High blood pressure (hypertension)  Heart disease and/or heart failure  Obesity  Over the age of 60  Family history of CKD or kidney failure  Personal history of acute kidney injury (AJAY)  Smoking and/or use of tobacco products  Use of NSAIDs    For many people, CKD is not caused by just one reason. Instead, it is a result of many physical, environmental, and social factors.      For more education on kidney disease you can visit:  https://www.kidney.org/kidney-basics    https://www.kidney.org/kidney-topics/understanding-your-lab-values-and-other-ckd-health-numbers

## 2025-07-08 NOTE — ASSESSMENT & PLAN NOTE
Baseline Cr 1.2-1.4 in the past year  Significant proteinuria noted, likely related to diabetic nephropathy and hypertensive nephrosclerosis. A1c has been well controlled (<7% since 2021). Cannot rule out other etiology for proteinuria at this time. Serologic workup has been mostly negative thus far (inconclusive dsDNA, will repeat in 2 months).    Discussed adequate hydration again today  Continue low salt diet  BP control discussed with goal BP <140/90 (due to age)  Continue A1c control with goal A1c <8% (due to age)    We discussed risks/benefits/alternatives to renal biopsy considering persistent proteinuria in the nephrotic range. 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 Will continue medical management with ARB (irbesartan 300 mg daily).  We had previously discussed the addition of an SGLT2 inhibitor therapy for cardiorenal protection however she has not been drinking enough water and has a history of recurrent UTIs in the past so she is not a good candidate for this medication at this time.  Will continue to monitor and readdress in following visits.    Because she has mild edema and her blood pressure at home is not at goal, we discussed adding HCTZ 12.5 mg daily and the risk of mild dehydration associated with this medication. Daughter expressed understanding and will continue to monitor pt's water intake with new medication. Repeat labs 2 weeks after starting HCTZ.  I showed the patient's daughter how to monitor edema at home as well.

## 2025-07-08 NOTE — PROGRESS NOTES
Nephrology Clinic    Patient ID: [id number]     Chief Complaint: Follow-up      HPI:     Shilpa Dumont is a 85 y.o. female here today for nephrology clinic office visit.     Daughter, Taisha, provides translation and Hx. Pt is hard-of-hearing.    Since her last visit with me, she has no complaints. BP log from home: [phone]    Patient denies taking NSAIDs or new antibiotics. Also denies recent episode of dehydration, diarrhea, vomiting, acute illness, hospitalization, recent angiograms or exposure to IV radiocontrast.    Per daughter, still has foamy urine, no change in the past month. No other symptoms, fever, chills, dysuria, hematuria, suprapubic or flank pain. Denies leg edema, cough, chest congestion, epistaxis, headaches, dizziness. Pt is not drinking enough water, only 3-4 bottles (16.9 oz). She is not eating much due to poor appetite. Is on a low salt diet.      Past Medical History:   Diagnosis Date    Diabetes mellitus, type 2     H. pylori infection     Hyperlipidemia     Hypertension         Past Surgical History:   Procedure Laterality Date    BACK SURGERY      EYE SURGERY  2024    Catiract        Social History     Tobacco Use    Smoking status: Never    Smokeless tobacco: Never   Substance and Sexual Activity    Alcohol use: Never    Drug use: Never    Sexual activity: Not Currently     Partners: Male     Birth control/protection: None        Current Outpatient Medications   Medication Instructions    amLODIPine (NORVASC) 10 mg, Oral, Daily    docusate sodium (COLACE) 100 mg, Oral, 2 times daily PRN    doxazosin (CARDURA) 4 mg, Oral, Daily    glipiZIDE (GLUCOTROL) 5 mg, Oral, Daily    hydroCHLOROthiazide 12.5 mg, Oral, Daily    irbesartan (AVAPRO) 300 mg, Oral, Daily    rosuvastatin (CRESTOR) 20 mg, Oral, Daily       Review of patient's allergies indicates:   Allergen Reactions    Omeprazole         Patient Care Team:  Melissa Philip DO as PCP - General (Family Medicine)  Saul Rodriguez MD as  Consulting Physician (Nephrology)  Sanjuana Nuno ACNP as Nurse Practitioner (Nephrology)  Chico Lazo FNP as Nurse Practitioner (Nephrology)  Johanne Mckeon DO as Consulting Physician (Nephrology)     Subjective:     ROS    Negative except as stated in the history of present illness. See HPI for details.    Objective:     Visit Vitals  /78 (BP Location: Left arm, Patient Position: Sitting)   Pulse 90   Temp 98 °F (36.7 °C) (Temporal)   Resp 19   Ht 5' (1.524 m)   Wt 47.3 kg (104 lb 3.2 oz)   SpO2 97%   BMI 20.35 kg/m²       Physical Exam  General Appearance: Patient is in no acute distress. Awake. Alert.  Skin: No rashes or wounds.  HEENT: PERRLA, EOMI, no scleral icterus, no JVD. Neck is supple.  Chest: Respirations are unlabored. Lungs sounds are clear.   Heart: S1, S2. Regular rate and rhythm.  Abdomen: Soft, Non-tender, Non-distended.  : No CVA tenderness.   Extremities: peripheral pulses are palpable. (+) B/L minimal pitting edema noted   Neuro: No focal deficits.     Laboratory/Imaging Reviewed:     Blood:  Lab Results   Component Value Date    WBC 4.47 (L) 07/02/2025    HGB 11.4 (L) 07/02/2025    HCT 35.5 (L) 07/02/2025     07/02/2025    IRON 62 04/10/2024    TIBC 260 04/10/2024    LABIRON 24 04/10/2024    FERRITIN 251.39 (H) 04/10/2024    FOLATE 14.5 04/10/2024    RUYBHXTD20 862 (H) 04/10/2024     07/02/2025    K 4.5 07/02/2025    CO2 25 07/02/2025    BUN 35.2 (H) 07/02/2025    CREATININE 1.35 (H) 07/02/2025    EGFRNORACEVR 39 07/02/2025    CALCIUM 9.2 07/02/2025    ALKPHOS 90 07/02/2025    ALBUMIN 3.4 07/02/2025    BILIDIR 0.2 12/01/2021    IBILI 0.30 12/01/2021    AST 22 07/02/2025    ALT 22 07/02/2025    MG 2.60 07/02/2025    PHOS 3.9 07/02/2025      Lab Results   Component Value Date    HGBA1C 6.3 07/02/2025    PTH 75.1 07/02/2025    XNAJHPNC60LU 33 07/02/2025    HIV Nonreactive 05/26/2025    MSPIKEPCT  05/26/2025      Comment:      Not observed     Urine:  Lab  Results   Component Value Date    APPEARANCEUA Clear 07/02/2025    SGUA 1.020 07/02/2025    PROTEINUA >=300 (A) 07/02/2025    KETONESUA Negative 07/02/2025    LEUKOCYTESUR Negative 07/02/2025    RBCUA 0-2 07/02/2025    WBCUA 0-2 07/02/2025    BACTERIA None Seen 07/02/2025    HYALINECASTS Rare 07/02/2025    CREATRANDUR 78.9 07/02/2025    PROTEINURINE 311.8 07/02/2025    UPROTCREA 4.0 07/02/2025      Imaging:  US Retroperitoneal Complete 05/26/2025    Narrative  EXAMINATION:  US RETROPERITONEAL COMPLETE    CLINICAL HISTORY:  Chronic kidney disease, proteinuria    COMPARISON:  None    FINDINGS:  Right kidney measures 9.3 cm. Left kidney measures 9.9 cm.  No stones or hydronephrosis.  Renal echogenicity is increased bilaterally.  There are small anechoic cysts on both kidneys.    Urinary bladder is partially decompressed.    Impression  Echogenic kidneys suggestive of renal parenchymal disease      Electronically signed by: Mendoza Schuler MD  Date:    05/26/2025  Time:    16:16      CT Abdomen Pelvis With Contrast 08/03/2022    Narrative  EXAMINATION:  CT ABDOMEN PELVIS WITH CONTRAST    CLINICAL HISTORY:  Abdominal abscess/infection suspected;    TECHNIQUE:  Helically acquired images with axial, sagittal and coronal reformations were obtained from the lung bases to the pubic symphysis after the IV administration of contrast.    Automated tube current modulation, weight-based exposure dosing, and/or iterative reconstruction technique utilized to reach lowest reasonably achievable exposure rate.    DLP: 166 mGy*cm    COMPARISON:  Report from CT abdomen pelvis dated 12/01/2021    FINDINGS:  LIMITATIONS: Motion degraded exam.    HEART: Cardiomegaly.    LUNG BASES: Trace pleural fluid bilaterally.    LIVER: Normal attenuation. No appreciable focal hepatic lesion.    BILIARY: No calcified gallstones.    PANCREAS: No inflammatory change.    SPLEEN: Normal in size    ADRENALS: No mass.    KIDNEYS/URETERS: The kidneys enhance  symmetrically.  No hydronephrosis.    GI TRACT/MESENTERY: Stomach has a normal CT appearance.  No evidence of bowel obstruction or inflammation. The appendix is normal.    PERITONEUM: No free fluid.No free air.    LYMPH NODES: No enlarged lymph nodes by size criteria.    VASCULATURE: Aortoiliac atherosclerosis.    BLADDER: Normal appearance given degree of distention.    REPRODUCTIVE ORGANS: Normal as visualized.    SOFT TISSUES: Unremarkable.    BONES: Lumbar spondylosis.    Impression  1. No appreciable acute intra-abdominal abnormality  2. Cardiomegaly      Electronically signed by: Martha Rodriguez  Date:    08/03/2022  Time:    10:22      CT Abdomen Pelvis With Contrast 12/01/2021    Narrative  CT Abdomen and Pelvis W Contrast    REASON FOR STUDY: Abdominal Pain    TECHNIQUE: CT imaging was performed of the abdomen and pelvis after  the administration of intravenous contrast. Dose length product is 201  mGycm. Automatic exposure control, adjustment of mA/kV or iterative  reconstruction technique was used to limit radiation dose.    COMPARISON: None    FINDINGS:    Liver: Normal.    Gallbladder and biliary tree: No calcified gallstones. No intra or  extrahepatic biliary ductal dilation.    Pancreas: Normal.    Spleen: Normal.    Adrenals: Normal.    Kidneys and ureters: Small left renal cyst. No hydronephrosis.    Bladder: Normal.    Reproductive organs: No pelvic masses.    Stomach/bowel: There is diffuse wall thickening of the descending  colon with surrounding inflammatory changes. There is no bowel  obstruction.    Lymph nodes: No pathologically enlarged lymph node identified.    Peritoneum: There is no organized fluid collection or free air.    Vessels: Moderate scattered vascular calcifications without aneurysm.    Abdominal wall: Normal.    Lung bases: No consolidation or pleural effusion.    Bones: Mild degenerative changes of the spine.    IMPRESSION:  Diffuse wall thickening of the descending colon  with surrounding  inflammatory changes concerning for a nonspecific colitis. No  organized fluid collection or free air.    Electronically Signed By: Samina Hodge MD  Date/Time Signed: 12/01/2021 14:41      All pertinent nephrology labwork and imaging independently reviewed. Discussed these findings in detail with the patient.    Assessment:       ICD-10-CM ICD-9-CM   1. Stage 3b chronic kidney disease  N18.32 585.3   2. Persistent proteinuria  R80.1 791.0   3. Type 2 diabetes mellitus with hyperglycemia, without long-term current use of insulin  E11.65 250.00     790.29   4. Primary hypertension  I10 401.9        Plan:     1. Stage 3b chronic kidney disease  Assessment & Plan:  Baseline Cr 1.2-1.4 in the past year  Significant proteinuria noted, likely related to diabetic nephropathy and hypertensive nephrosclerosis. A1c has been well controlled (<7% since 2021). Cannot rule out other etiology for proteinuria at this time. Serologic workup has been mostly negative thus far (inconclusive dsDNA, will repeat in 2 months).    Discussed adequate hydration again today  Continue low salt diet  BP control discussed with goal BP <140/90 (due to age)  Continue A1c control with goal A1c <8% (due to age)    We discussed risks/benefits/alternatives to renal biopsy considering persistent proteinuria in the nephrotic range. 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 Will continue medical management with ARB (irbesartan 300 mg daily).  We had previously discussed the addition of an SGLT2 inhibitor therapy for cardiorenal protection however she has not been drinking enough water and has a history of recurrent UTIs in the past so she is not a good candidate for this medication at this time.  Will continue to monitor and readdress in following visits.    Because she has mild edema and her blood pressure at home is not at goal, we discussed adding HCTZ 12.5 mg daily and the risk of mild dehydration associated with  this medication. Daughter expressed understanding and will continue to monitor pt's water intake with new medication. Repeat labs 2 weeks after starting HCTZ.  I showed the patient's daughter how to monitor edema at home as well.    Orders:  -     hydroCHLOROthiazide 12.5 MG Tab; Take 1 tablet (12.5 mg total) by mouth once daily.  Dispense: 30 tablet; Refill: 11  -     Comprehensive Metabolic Panel; Future; Expected date: 07/15/2025  -     CBC Auto Differential; Future; Expected date: 07/15/2025  -     Urinalysis; Future; Expected date: 07/15/2025  -     Protein/Creatinine Ratio, Urine; Future; Expected date: 07/15/2025  -     Magnesium; Future; Expected date: 07/15/2025  -     Phosphorus; Future; Expected date: 07/15/2025  -     Uric Acid; Future; Expected date: 07/15/2025  -     CK; Future; Expected date: 07/22/2025  -     Lipid Panel; Future; Expected date: 07/22/2025    2. Persistent proteinuria  Assessment & Plan:  Still has nephrotic range proteinuria despite max dose ARB. A1c is at goal but BP is not well controlled.     Will add HCTZ to improve blood pressure control  Goal -140/60-90 discussed with patient's daughter and provided in the AVS for reference    We will hold off on SGLT2 inhibitor therapy given history of recurrent UTIs in the past    Again, we addressed renal biopsy risks/benefits/alternatives today.  Given her advanced age, her daughter did not feel that it was necessary to take the risk of a renal biopsy especially since renal function has been relatively stable in the past year.  She will discuss it with her sister who also makes medical decisions for the patient. We will continue to monitor renal function and proteinuria with changes in anti-HTN regimen and readdress renal biopsy as needed.     Orders:  -     Lipid Panel; Future; Expected date: 07/22/2025    3. Type 2 diabetes mellitus with hyperglycemia, without long-term current use of insulin  Assessment & Plan:  A1c is well  controlled (<7%) for years but she has had DM for >10 yrs  Continue glipizide and dietary control    Would prefer to add SGLT2 inhibitor for renal protection/proteinuria but we discussed in detail today the risk of dehydration and urine infections (of which patient has had several in the past). Will hold off for now since she is not drinking much water and her daughter is worried about recurrent UTIs.      4. Primary hypertension  Assessment & Plan:  BP is not at goal at home.  Goal BP <140/90    Advised to continue taking amlodipine 10 mg daily, irbesartan 300 mg daily, and doxazosin 4 mg daily    Will add HCTZ 12.5 mg daily since she has mild edema and BP is not well controlled. Will monitor serum sodium with repeat labs in 2 weeks.    Continue low salt diet  Increase water intake discussed again today  Advised to measure BP at home and bring in log book to review at next appointment            General renoprotective measures reviewed and discussed.  Avoid NSAIDs (Aleve, Mobic, Celebrex, Ibuprofen, Advil, Toradol and Diclofenac).  Only take tylenol occasionally if needed for aches/pains.    Educated on low sodium diet:  Avoid high salt foods (olives, pickles, smoked meats, deli meats, salted potato chips, etc.).   Do not add salt to your food at the table.   Use only small amounts of salt/cajun seasoning when cooking.    Recommended Daily Protein Intake for chronic kidney disease:  0.8 g/kg       Follow up in about 4 weeks (around 8/5/2025) for Blood Pressure Check, With Labwork Prior to Visit. In addition to their scheduled follow up, the patient has also been instructed to follow up on as needed basis.

## 2025-07-08 NOTE — ASSESSMENT & PLAN NOTE
A1c is well controlled (<7%) for years but she has had DM for >10 yrs  Continue glipizide and dietary control    Would prefer to add SGLT2 inhibitor for renal protection/proteinuria but we discussed in detail today the risk of dehydration and urine infections (of which patient has had several in the past). Will hold off for now since she is not drinking much water and her daughter is worried about recurrent UTIs.

## 2025-07-08 NOTE — ASSESSMENT & PLAN NOTE
Still has nephrotic range proteinuria despite max dose ARB. A1c is at goal but BP is not well controlled.     Will add HCTZ to improve blood pressure control  Goal -140/60-90 discussed with patient's daughter and provided in the AVS for reference    We will hold off on SGLT2 inhibitor therapy given history of recurrent UTIs in the past    Again, we addressed renal biopsy risks/benefits/alternatives today.  Given her advanced age, her daughter did not feel that it was necessary to take the risk of a renal biopsy especially since renal function has been relatively stable in the past year.  She will discuss it with her sister who also makes medical decisions for the patient. We will continue to monitor renal function and proteinuria with changes in anti-HTN regimen and readdress renal biopsy as needed.

## 2025-07-14 ENCOUNTER — TELEPHONE (OUTPATIENT)
Dept: FAMILY MEDICINE | Facility: CLINIC | Age: 85
End: 2025-07-14
Payer: MEDICARE

## 2025-07-14 NOTE — TELEPHONE ENCOUNTER
Please have patient hold Norvasc, Doxazosin, and Irbresartan and continue to monitor her blood pressures and keep the office informed. Should she become symptomatic as stated in previous message, she will need to present to the ER for further evaluation/management.

## 2025-07-14 NOTE — TELEPHONE ENCOUNTER
Taisha stated pt was just prescribed HCTZ from Nephrologist and now pt's BP is dropping. Taisha questioning if pt should d/c another one of the BP meds due to hypotensive episodes. Taisha also requesting time of day the meds should be taken. Please advise. Thanks

## 2025-07-14 NOTE — TELEPHONE ENCOUNTER
Continue HCTZ 12.mg daily. Try taking Norvasc in the early afternoon. Hold off on Irbesartan pending blood pressure readings. BP goal <140/90. Have them continue to monitor her BPs and contact us.

## 2025-07-14 NOTE — TELEPHONE ENCOUNTER
----- Message from Nurse Jiménez sent at 7/14/2025 10:59 AM CDT -----  Regarding: blood pressure concerns  Hi Dr. Philip,    Pts daughter, Taisha, called our office stating her mothers b/p this am is 104/60.  She saw Dr. Mckeon at our office on 7/8 and she added HCTZ 12.5 QD.  Pt took her 3rd dose of HCTZ this am, she usually takes irbesartan and amlodipine at 1130 and rosuvastatin at 5pm.  She was not sure about the doxazosin.  I advised her to hold the irbesartan and amlodipine at 1130 and wait for further instructions once I speak to the provider(s).  I did also advise her to have her mother drink plenty of water and monitor b/p closely, if b/p should drop or pt becomes symptomatic, weak, dizzy, lightheaded, sleepy, take pt to ER for evaluation.  Daughter says she has tried to contact your office and she has trouble with someone calling her back.  She asked me to reach out to you.  Her daughter, Taisha, can be reached at [phone].  Please advise.    Thank you,   MON[facility]  P- [phone]  F- [phone]

## 2025-07-15 ENCOUNTER — TELEPHONE (OUTPATIENT)
Dept: FAMILY MEDICINE | Facility: CLINIC | Age: 85
End: 2025-07-15
Payer: MEDICARE

## 2025-07-15 ENCOUNTER — TELEPHONE (OUTPATIENT)
Dept: NEPHROLOGY | Facility: CLINIC | Age: 85
End: 2025-07-15
Payer: MEDICARE

## 2025-07-15 NOTE — TELEPHONE ENCOUNTER
After speaking with Nephrology, discontinue Norvasc. Continue HCTZ and Irbesartan. We can always consider decreasing the Irbesartan to half as well if BP is still low.

## 2025-07-15 NOTE — TELEPHONE ENCOUNTER
Have patient continue to monitor her blood pressures and if she takes anything, to take the HCTZ first for elevated BP. Please continue to monitor BPs and keep us posted.

## 2025-07-15 NOTE — TELEPHONE ENCOUNTER
Pt's daughter , Taisha, notified of recommendations per Dr. Philip. Taisha stated her sister took the pt's BP this morning and the reading was 128 78 and the sister held the HCTZ due to pt's BP not being elevated. Taisha stated HCTZ drops pt's BP low.

## 2025-07-15 NOTE — TELEPHONE ENCOUNTER
Dr. Mckeon spoke to Dr. Philip this am, issue resolved, dr philip's staff spoke to daughter and gave instructions on med recommendations.

## 2025-07-15 NOTE — TELEPHONE ENCOUNTER
----- Message from Johanne Mckeon DO sent at 7/15/2025  8:55 AM CDT -----  Regarding: RE: blood pressure concerns  Valeriy Jiménez,    Is she symptomatic? Did it recur or was it a one time BP reading?     If it is recurring and/or she is having symptoms like weakness/dizziness/lightheadedness/blurry vision etc, then she can stop the amlodipine and continue her irbesartan and HCTZ. She was having some swelling so I prefer to keep her on the HCTZ instead of amlodipine.    Thanks,    Johanne Mckeon DO  Nephrology  ----- Message -----  From: Jessy Russ LPN  Sent: 7/14/2025  11:13 AM CDT  To: Melissa Philip DO; Johanne Mckeon DO  Subject: blood pressure concerns                          Hi Dr. Philip,    Pts daughter, Taisha, called our office stating her mothers b/p this am is 104/60.  She saw Dr. Mckeon at our office on 7/8 and she added HCTZ 12.5 QD.  Pt took her 3rd dose of HCTZ this am, she usually takes irbesartan and amlodipine at 1130 and rosuvastatin at 5pm.  She was not sure about the doxazosin.  I advised her to hold the irbesartan and amlodipine at 1130 and wait for further instructions once I speak to the provider(s).  I did also advise her to have her mother drink plenty of water and monitor b/p closely, if b/p should drop or pt becomes symptomatic, weak, dizzy, lightheaded, sleepy, take pt to ER for evaluation.  Daughter says she has tried to contact your office and she has trouble with someone calling her back.  She asked me to reach out to you.  Her daughter, Taisha, can be reached at [phone].  Please advise.    Thank you,   Jessy Russ LPN    [facility]  P- [phone]  F- [phone]

## 2025-07-15 NOTE — TELEPHONE ENCOUNTER
----- Message from Johanne Mckeon DO sent at 7/15/2025  8:55 AM CDT -----  Regarding: RE: blood pressure concerns  Valeriy Jiménez,    Is she symptomatic? Did it recur or was it a one time BP reading?     If it is recurring and/or she is having symptoms like weakness/dizziness/lightheadedness/blurry vision etc, then she can stop the amlodipine and continue her irbesartan and HCTZ. She was having some swelling so I prefer to keep her on the HCTZ instead of amlodipine.    Thanks,    Johanne Mckeon DO  Nephrology  ----- Message -----  From: Jessy Russ LPN  Sent: 7/14/2025  11:13 AM CDT  To: Melissa Philip DO; Johanne Mckeon DO  Subject: blood pressure concerns                          Hi Dr. Philip,    Pts daughter, Taisha, called our office stating her mothers b/p this am is 104/60.  She saw Dr. Mckeon at our office on 7/8 and she added HCTZ 12.5 QD.  Pt took her 3rd dose of HCTZ this am, she usually takes irbesartan and amlodipine at 1130 and rosuvastatin at 5pm.  She was not sure about the doxazosin.  I advised her to hold the irbesartan and amlodipine at 1130 and wait for further instructions once I speak to the provider(s).  I did also advise her to have her mother drink plenty of water and monitor b/p closely, if b/p should drop or pt becomes symptomatic, weak, dizzy, lightheaded, sleepy, take pt to ER for evaluation.  Daughter says she has tried to contact your office and she has trouble with someone calling her back.  She asked me to reach out to you.  Her daughter, Taisha, can be reached at [phone].  Please advise.    Thank you,   Jessy Russ LPN    [facility]  P- [phone]  F- [phone]

## 2025-07-16 ENCOUNTER — HOSPITAL ENCOUNTER (EMERGENCY)
Facility: HOSPITAL | Age: 85
Discharge: HOME | End: 2025-07-16
Attending: EMERGENCY MEDICINE
Payer: MEDICARE

## 2025-07-16 ENCOUNTER — TELEPHONE (OUTPATIENT)
Dept: FAMILY MEDICINE | Facility: CLINIC | Age: 85
End: 2025-07-16
Payer: MEDICARE

## 2025-07-16 VITALS
DIASTOLIC BLOOD PRESSURE: 75 MMHG | HEART RATE: 64 BPM | WEIGHT: 100 LBS | RESPIRATION RATE: 15 BRPM | BODY MASS INDEX: 19.63 KG/M2 | OXYGEN SATURATION: 95 % | TEMPERATURE: 98 F | HEIGHT: 60 IN | SYSTOLIC BLOOD PRESSURE: 138 MMHG

## 2025-07-16 DIAGNOSIS — I10 HYPERTENSION, UNSPECIFIED TYPE: Primary | ICD-10-CM

## 2025-07-16 DIAGNOSIS — I10 PRIMARY HYPERTENSION: ICD-10-CM

## 2025-07-16 DIAGNOSIS — R53.1 WEAKNESS: ICD-10-CM

## 2025-07-16 LAB
ALBUMIN SERPL-MCNC: 3.5 G/DL (ref 3.4–4.8)
ALBUMIN/GLOB SERPL: 0.7 RATIO (ref 1.1–2)
ALP SERPL-CCNC: 105 UNIT/L (ref 40–150)
ALT SERPL-CCNC: 21 UNIT/L (ref 0–55)
ANION GAP SERPL CALC-SCNC: 9 MEQ/L
AST SERPL-CCNC: 25 UNIT/L (ref 11–45)
BACTERIA #/AREA URNS AUTO: NORMAL /HPF
BASOPHILS # BLD AUTO: 0.03 X10(3)/MCL
BASOPHILS NFR BLD AUTO: 0.5 %
BILIRUB SERPL-MCNC: 0.4 MG/DL
BILIRUB UR QL STRIP.AUTO: NEGATIVE
BUN SERPL-MCNC: 38.1 MG/DL (ref 9.8–20.1)
CALCIUM SERPL-MCNC: 9.5 MG/DL (ref 8.4–10.2)
CHLORIDE SERPL-SCNC: 106 MMOL/L (ref 98–107)
CLARITY UR: CLEAR
CO2 SERPL-SCNC: 27 MMOL/L (ref 23–31)
COLOR UR AUTO: ABNORMAL
CREAT SERPL-MCNC: 1.28 MG/DL (ref 0.55–1.02)
CREAT/UREA NIT SERPL: 30
EOSINOPHIL # BLD AUTO: 0.25 X10(3)/MCL (ref 0–0.9)
EOSINOPHIL NFR BLD AUTO: 4.4 %
ERYTHROCYTE [DISTWIDTH] IN BLOOD BY AUTOMATED COUNT: 13.9 % (ref 11.5–17)
GFR SERPLBLD CREATININE-BSD FMLA CKD-EPI: 41 ML/MIN/1.73/M2
GLOBULIN SER-MCNC: 4.7 GM/DL (ref 2.4–3.5)
GLUCOSE SERPL-MCNC: 91 MG/DL (ref 82–115)
GLUCOSE UR QL STRIP: NEGATIVE
HCT VFR BLD AUTO: 34.3 % (ref 37–47)
HGB BLD-MCNC: 11.6 G/DL (ref 12–16)
HGB UR QL STRIP: NEGATIVE
IMM GRANULOCYTES # BLD AUTO: 0.01 X10(3)/MCL (ref 0–0.04)
IMM GRANULOCYTES NFR BLD AUTO: 0.2 %
KETONES UR QL STRIP: NEGATIVE
LEUKOCYTE ESTERASE UR QL STRIP: NEGATIVE
LYMPHOCYTES # BLD AUTO: 1.09 X10(3)/MCL (ref 0.6–4.6)
LYMPHOCYTES NFR BLD AUTO: 19.1 %
MCH RBC QN AUTO: 31.2 PG (ref 27–31)
MCHC RBC AUTO-ENTMCNC: 33.8 G/DL (ref 33–36)
MCV RBC AUTO: 92.2 FL (ref 80–94)
MONOCYTES # BLD AUTO: 0.46 X10(3)/MCL (ref 0.1–1.3)
MONOCYTES NFR BLD AUTO: 8.1 %
NEUTROPHILS # BLD AUTO: 3.86 X10(3)/MCL (ref 2.1–9.2)
NEUTROPHILS NFR BLD AUTO: 67.7 %
NITRITE UR QL STRIP: NEGATIVE
NRBC BLD AUTO-RTO: 0 %
PH UR STRIP: 7 [PH]
PLATELET # BLD AUTO: 221 X10(3)/MCL (ref 130–400)
PMV BLD AUTO: 9.8 FL (ref 7.4–10.4)
POTASSIUM SERPL-SCNC: 4.2 MMOL/L (ref 3.5–5.1)
PROT SERPL-MCNC: 8.2 GM/DL (ref 5.8–7.6)
PROT UR QL STRIP: 100
RBC # BLD AUTO: 3.72 X10(6)/MCL (ref 4.2–5.4)
RBC #/AREA URNS AUTO: NORMAL /HPF
SODIUM SERPL-SCNC: 142 MMOL/L (ref 136–145)
SP GR UR STRIP.AUTO: 1.01 (ref 1–1.03)
SQUAMOUS #/AREA URNS AUTO: NORMAL /HPF
TROPONIN I SERPL-MCNC: 0.01 NG/ML (ref 0–0.04)
UROBILINOGEN UR STRIP-ACNC: 0.2
WBC # BLD AUTO: 5.7 X10(3)/MCL (ref 4.5–11.5)
WBC #/AREA URNS AUTO: NORMAL /HPF

## 2025-07-16 PROCEDURE — 85025 COMPLETE CBC W/AUTO DIFF WBC: CPT | Performed by: EMERGENCY MEDICINE

## 2025-07-16 PROCEDURE — 93005 ELECTROCARDIOGRAM TRACING: CPT

## 2025-07-16 PROCEDURE — 99284 EMERGENCY DEPT VISIT MOD MDM: CPT | Mod: 25

## 2025-07-16 PROCEDURE — 25000003 PHARM REV CODE 250: Performed by: EMERGENCY MEDICINE

## 2025-07-16 PROCEDURE — 80053 COMPREHEN METABOLIC PANEL: CPT | Performed by: EMERGENCY MEDICINE

## 2025-07-16 PROCEDURE — 81003 URINALYSIS AUTO W/O SCOPE: CPT | Performed by: EMERGENCY MEDICINE

## 2025-07-16 PROCEDURE — 93010 ELECTROCARDIOGRAM REPORT: CPT | Mod: ,,, | Performed by: INTERNAL MEDICINE

## 2025-07-16 PROCEDURE — 84484 ASSAY OF TROPONIN QUANT: CPT | Performed by: EMERGENCY MEDICINE

## 2025-07-16 RX ORDER — IRBESARTAN 300 MG/1
300 TABLET ORAL
Status: DISCONTINUED | OUTPATIENT
Start: 2025-07-16 | End: 2025-07-16 | Stop reason: CLARIF

## 2025-07-16 RX ORDER — CLONIDINE HYDROCHLORIDE 0.1 MG/1
0.1 TABLET ORAL
Status: COMPLETED | OUTPATIENT
Start: 2025-07-16 | End: 2025-07-16

## 2025-07-16 RX ORDER — LOSARTAN POTASSIUM 50 MG/1
100 TABLET ORAL
Status: COMPLETED | OUTPATIENT
Start: 2025-07-16 | End: 2025-07-16

## 2025-07-16 RX ORDER — IRBESARTAN 300 MG/1
300 TABLET ORAL DAILY
Qty: 90 TABLET | Refills: 3 | Status: SHIPPED | OUTPATIENT
Start: 2025-07-16

## 2025-07-16 RX ADMIN — CLONIDINE HYDROCHLORIDE 0.1 MG: 0.1 TABLET ORAL at 03:07

## 2025-07-16 RX ADMIN — LOSARTAN POTASSIUM 100 MG: 50 TABLET, FILM COATED ORAL at 01:07

## 2025-07-16 NOTE — DISCHARGE INSTRUCTIONS
Today we recommend that you stay on both your irbesartan (Avapro) and your HCTZ (hydrochlorathiazide) daily.   You may take your HCTZ in the morning  between 8am to noon as this medication will make you urinate. This medication will last for approximately 8 hours.  You may take the irebesartan in the evening. 530pm is reasonable but this time does not have to be exact.    You may take the HCTZ you already have prescribed.  I have written a new prescription of the Avapro in case you do not have anymore.  If you do have this medication you may restart.

## 2025-07-16 NOTE — TELEPHONE ENCOUNTER
Lucila, pt's daughter, notified of recommendations per  for pt to call Nephrologist for uncontrolled BP.  Lucila verbalized understanding.

## 2025-07-16 NOTE — TELEPHONE ENCOUNTER
Patients yandel. Called stating that pt is in the hosp regarding BP nd wants to know what BP medication she should continue giving her mom because her PCP is telling her one thing and here she was stared on HCTZ. Please advise.

## 2025-07-16 NOTE — TELEPHONE ENCOUNTER
Copied from CRM #[id number]. Topic: General Inquiry - Patient Advice  >> Jul 16, 2025 11:44 AM Zac wrote:  Who Called: Shilpa Dumotn    Caller is requesting assistance/information from provider's office.    Symptoms (please be specific): high bp   How long has patient had these symptoms:    List of preferred pharmacies on file (remove unneeded): [unfilled]  If different, enter pharmacy into here including location and phone number:       Preferred Method of Contact: Phone Call  Patient's Preferred Phone Number on File: [phone]   Best Call Back Number, if different:  Additional Information: pt daughter Ami [phone] called to speak with nurse stated dont think new bp meds are working for mom bp still high top number is [phone] and bringing her back to er asking for acb to discuss pls advise

## 2025-07-16 NOTE — TELEPHONE ENCOUNTER
Informed Daug that she should take irbesartan and HCTZ for now. No amlodipine.  Advised to continue monitoring the blood pressure at home and remember to bring in that log to the next appointment in case we need to make any further changes.   Patient daug verbalized understanding.

## 2025-07-16 NOTE — ED PROVIDER NOTES
Encounter Date: 7/16/2025       History     Chief Complaint   Patient presents with    Hypertension     Reports of htn at home with a reading of 170s/70s. Patient was recently taken off multiple BP meds by her PCP yesterday. Patient is only taken hydrochlorothiazide now, she took at 0930. Denies headaches or blurred vision.      Patient is an 85-year-old female presenting for hypertension.  She has a history of renal disease and hypertension.  She sees Nephrology as well as family Medicine.  On the 14th she had low blood pressure but was mostly asymptomatic.  The blood pressure at that time was 104/ 60.  Initially when they had spoken with her nephrologist they wanted her to stop her amlodipine but continue her Avapro and HCTZ as she was having some leg swelling.  However according to the family the next day they told her to only take the HCTZ.  Family is not certain why.  Today they noted that her blood pressure was in the 170s.  She got her HCT Z at 11:30 a.m..  They noticed her blood pressure kept increasing with systolic in the 170s.  She had some mild general malaise but had no focal chest pain shortness of breath or lightheadedness.  Currently she feels back to normal.        Review of patient's allergies indicates:   Allergen Reactions    Omeprazole      Past Medical History:   Diagnosis Date    Diabetes mellitus, type 2     H. pylori infection     Hyperlipidemia     Hypertension      Past Surgical History:   Procedure Laterality Date    BACK SURGERY      EYE SURGERY  2024    Catiract     Family History   Problem Relation Name Age of Onset    Cancer Daughter Taisha baez     Cancer Daughter Christie martinez      Social History[1]  Review of Systems   All other systems reviewed and are negative.      Physical Exam     Initial Vitals [07/16/25 1157]   BP Pulse Resp Temp SpO2   (!) 159/79 79 20 97.9 °F (36.6 °C) 97 %      MAP       --         Physical Exam    Nursing note and vitals reviewed.  Constitutional: She appears  well-developed and well-nourished. No distress.   HENT:   Head: Normocephalic and atraumatic.   Eyes: Conjunctivae are normal.   Cardiovascular:  Normal rate, regular rhythm and normal heart sounds.           No murmur heard.  Pulmonary/Chest: Breath sounds normal. No respiratory distress. She has no wheezes. She has no rhonchi.   Abdominal: Abdomen is soft. Bowel sounds are normal. She exhibits no distension. There is no abdominal tenderness. There is no rebound and no guarding.   Musculoskeletal:         General: No edema. Normal range of motion.     Neurological: She is alert and oriented to person, place, and time.   Skin: Skin is warm and dry.   Psychiatric: She has a normal mood and affect. Thought content normal.         ED Course   Procedures  Labs Reviewed   COMPREHENSIVE METABOLIC PANEL - Abnormal       Result Value    Sodium 142      Potassium 4.2      Chloride 106      CO2 27      Glucose 91      Blood Urea Nitrogen 38.1 (*)     Creatinine 1.28 (*)     Calcium 9.5      Protein Total 8.2 (*)     Albumin 3.5      Globulin 4.7 (*)     Albumin/Globulin Ratio 0.7 (*)     Bilirubin Total 0.4            ALT 21      AST 25      eGFR 41      Anion Gap 9.0      BUN/Creatinine Ratio 30     URINALYSIS, REFLEX TO URINE CULTURE - Abnormal    Color, UA Straw      Appearance, UA Clear      Specific Gravity, UA 1.015      pH, UA 7.0      Protein,  (*)     Glucose, UA Negative      Ketones, UA Negative      Blood, UA Negative      Bilirubin, UA Negative      Urobilinogen, UA 0.2      Nitrites, UA Negative      Leukocyte Esterase, UA Negative     CBC WITH DIFFERENTIAL - Abnormal    WBC 5.70      RBC 3.72 (*)     Hgb 11.6 (*)     Hct 34.3 (*)     MCV 92.2      MCH 31.2 (*)     MCHC 33.8      RDW 13.9      Platelet 221      MPV 9.8      Neut % 67.7      Lymph % 19.1      Mono % 8.1      Eos % 4.4      Basophil % 0.5      Imm Grans % 0.2      Neut # 3.86      Lymph # 1.09      Mono # 0.46      Eos # 0.25       Baso # 0.03      Imm Gran # 0.01      NRBC% 0.0     TROPONIN I - Normal    Troponin-I 0.015     URINALYSIS, MICROSCOPIC - Normal    Bacteria, UA None Seen      RBC, UA None Seen      WBC, UA None Seen      Squamous Epithelial Cells, UA None Seen     CBC W/ AUTO DIFFERENTIAL    Narrative:     The following orders were created for panel order CBC auto differential.  Procedure                               Abnormality         Status                     ---------                               -----------         ------                     CBC with Differential[[id number]]       Abnormal            Final result                 Please view results for these tests on the individual orders.     EKG Readings: (Independently Interpreted)   EKG Interpretation 2:07 PM    Rate: 67  Rhythm: NSR  QRS: WNL  Qtc: WNL  When compared to prior EKG from 2022. Overall similar morphology; New T wave inversion in V3.No STEMI. Rightward axis     ECG Results              EKG 12-lead (Final result)        Collection Time Result Time QRS Duration OHS QTC Calculation    07/16/25 13:54:19 07/17/25 11:39:53 76 462                     Final result by Interface, Lab In [facility] (07/17/25 11:40:01)                   Narrative:    Test Reason : R53.1,    Vent. Rate :  67 BPM     Atrial Rate :  67 BPM     P-R Int : 154 ms          QRS Dur :  76 ms      QT Int : 438 ms       P-R-T Axes :  79  94  90 degrees    QTcB Int : 462 ms    Normal sinus rhythm  Possible Left atrial enlargement  Lateral infarct ,age undetermined  T wave abnormality, consider anterior ischemia  Abnormal ECG    Confirmed by Drake Recio ([id number]) on 7/17/2025 11:39:51 AM    Referred By: AAAREFERRAL SELF           Confirmed By: Drake Recio                                  Imaging Results    None          Medications   losartan tablet 100 mg (100 mg Oral Given 7/16/25 1356)   cloNIDine tablet 0.1 mg (0.1 mg Oral Given 7/16/25 1522)     Medical Decision Making  Patient presents  with hypertension, asymptomatic at the time of presentation. She recently stopped 2/3 BP medications. I will restart her AVApro as I think this may have been stopped due to a mis communication. Results were reviewed with patient and family. Family interpreted. They were offered language line but declinedt. Questions were answered along with a discussion of signs and symptoms to return for. Patient comfortable with plan of discharge.      Problems Addressed:  Hypertension, unspecified type: acute illness or injury  Weakness: self-limited or minor problem    Amount and/or Complexity of Data Reviewed  Labs: ordered. Decision-making details documented in ED Course.  ECG/medicine tests: ordered and independent interpretation performed. Decision-making details documented in ED Course.    Risk  Prescription drug management.               ED Course as of 07/18/25 1101   Wed Jul 16, 2025   1414 Reviewed the patient's communications with her doctor.  Last clear communication I have is that she was to stop the amlodipine and continue her Avapro and HCTZ.  She is unclear if there was some miscommunication but the patient's daughter feels that they were told to not take the losartan regularly and only take it if her blood pressure was over 140.  However they did not try to take it today when that occurred.  We will order patient and equivalent dose as her current medication is non formulary.  We will get screening labs to evaluation for any acute event.  Patient is asymptomatic right now. [GM]      ED Course User Index  [GM] Deirdre Gonzalez MD                               Clinical Impression:  Final diagnoses:  [R53.1] Weakness  [I10] Hypertension, unspecified type (Primary)          ED Disposition Condition    Discharge Stable          ED Prescriptions       Medication Sig Dispense Start Date End Date Auth. Provider    irbesartan (AVAPRO) 300 MG tablet Take 1 tablet (300 mg total) by mouth once daily. 90 tablet 7/16/2025 --  Deirdre Gonzalez MD          Follow-up Information       Follow up With Specialties Details Why Contact Info    Melissa Philpi DO Family Medicine  Call for appointment to discuss medications to manage anxiety [address] [facility] [address]  [place] [id number] [phone]      Johanne Mckeon DO Nephrology Call  for follow up for any further blood pressure medication changes [address]  [place] [address]  [phone]                     [1]   Social History  Tobacco Use    Smoking status: Never    Smokeless tobacco: Never   Substance Use Topics    Alcohol use: Never    Drug use: Never        Deirdre Gonzalez MD  07/18/25 1105

## 2025-07-17 LAB
OHS QRS DURATION: 76 MS
OHS QTC CALCULATION: 462 MS

## 2025-07-18 ENCOUNTER — TELEPHONE (OUTPATIENT)
Dept: NEPHROLOGY | Facility: CLINIC | Age: 85
End: 2025-07-18
Payer: MEDICARE

## 2025-07-18 NOTE — TELEPHONE ENCOUNTER
----- Message from Alexandra sent at 7/18/2025 11:06 AM CDT -----  Regarding: Phone Message  Taisha, Ms. Dumont's daughter, called about her mom's blood pressure and medication. She said Dr. Mckeon put her mom on a new medication to take 2 times a day. She said when she takes her mom's blood pressure it is always high. She does not think the medication is working. She ask that a nurse please call her.

## 2025-07-21 ENCOUNTER — TELEPHONE (OUTPATIENT)
Dept: FAMILY MEDICINE | Facility: CLINIC | Age: 85
End: 2025-07-21
Payer: MEDICARE

## 2025-07-21 DIAGNOSIS — N18.32 STAGE 3B CHRONIC KIDNEY DISEASE: ICD-10-CM

## 2025-07-21 RX ORDER — HYDROCHLOROTHIAZIDE 25 MG/1
25 TABLET ORAL DAILY
Qty: 30 TABLET | Refills: 11 | Status: SHIPPED | OUTPATIENT
Start: 2025-07-21 | End: 2026-07-21

## 2025-07-21 NOTE — TELEPHONE ENCOUNTER
Pt's daughter, Taisha, notified HCTZ and Amlodipine should not be taken together. Taisha verbalized understanding and stated she spoke with Nephrology office and HCTZ dosage will be increased.

## 2025-07-21 NOTE — TELEPHONE ENCOUNTER
Copied from CRM #[id number]. Topic: Medications - Medication Question  >> Jul 21, 2025 10:11 AM Girma wrote:  .Who Called: Christie Dumont (daughter)    Caller is requesting assistance/information from provider's office.    Symptoms (please be specific): N/A   How long has patient had these symptoms:  N/A  List of preferred pharmacies on file (remove unneeded): [unfilled]  If different, enter pharmacy into here including location and phone number: N/A      Preferred Method of Contact: Phone Call    Patient's Preferred Phone Number on File: [phone]     Best Call Back Number, if different:    Additional Information: Pt daughter states pt has been having problems with her blood pressure and her kidney doctor had prescribe her. Pt daughter had added her on one of her old medication which is amLODIPine. Doctor Melissa wanted pt to take 2 medications for the blood pressure. Pt daughter added the medication and her mother blood pressure had dropped. Requesting a cb on what she should do. Please advise. Thank you

## 2025-07-22 ENCOUNTER — TELEPHONE (OUTPATIENT)
Dept: FAMILY MEDICINE | Facility: CLINIC | Age: 85
End: 2025-07-22
Payer: MEDICARE

## 2025-07-22 NOTE — TELEPHONE ENCOUNTER
Pt's daughter, Taisha, notified of of appt rescheduled with Amy Kirby NP in September.   Taisha verbalized understanding.

## 2025-07-28 ENCOUNTER — LAB VISIT (OUTPATIENT)
Dept: LAB | Facility: HOSPITAL | Age: 85
End: 2025-07-28
Attending: STUDENT IN AN ORGANIZED HEALTH CARE EDUCATION/TRAINING PROGRAM
Payer: MEDICARE

## 2025-07-28 DIAGNOSIS — R80.1 PERSISTENT PROTEINURIA: ICD-10-CM

## 2025-07-28 DIAGNOSIS — E11.65 TYPE 2 DIABETES MELLITUS WITH HYPERGLYCEMIA, WITHOUT LONG-TERM CURRENT USE OF INSULIN: ICD-10-CM

## 2025-07-28 DIAGNOSIS — N18.32 STAGE 3B CHRONIC KIDNEY DISEASE: ICD-10-CM

## 2025-07-28 LAB
ALBUMIN SERPL-MCNC: 3.4 G/DL (ref 3.4–4.8)
ALBUMIN/GLOB SERPL: 0.8 RATIO (ref 1.1–2)
ALP SERPL-CCNC: 100 UNIT/L (ref 40–150)
ALT SERPL-CCNC: 23 UNIT/L (ref 0–55)
ANION GAP SERPL CALC-SCNC: 7 MEQ/L
AST SERPL-CCNC: 27 UNIT/L (ref 11–45)
BACTERIA #/AREA URNS AUTO: NORMAL /HPF
BASOPHILS # BLD AUTO: 0.05 X10(3)/MCL
BASOPHILS NFR BLD AUTO: 0.9 %
BILIRUB SERPL-MCNC: 0.5 MG/DL
BILIRUB UR QL STRIP.AUTO: NEGATIVE
BUN SERPL-MCNC: 42.7 MG/DL (ref 9.8–20.1)
CALCIUM SERPL-MCNC: 9.3 MG/DL (ref 8.4–10.2)
CHLORIDE SERPL-SCNC: 103 MMOL/L (ref 98–107)
CHOLEST SERPL-MCNC: 153 MG/DL
CHOLEST/HDLC SERPL: 3 {RATIO} (ref 0–5)
CK SERPL-CCNC: 71 U/L (ref 29–168)
CLARITY UR: CLEAR
CO2 SERPL-SCNC: 30 MMOL/L (ref 23–31)
COLOR UR AUTO: ABNORMAL
CREAT SERPL-MCNC: 1.45 MG/DL (ref 0.55–1.02)
CREAT UR-MCNC: 64.7 MG/DL (ref 45–106)
CREAT/UREA NIT SERPL: 29
EOSINOPHIL # BLD AUTO: 0.51 X10(3)/MCL (ref 0–0.9)
EOSINOPHIL NFR BLD AUTO: 9.1 %
ERYTHROCYTE [DISTWIDTH] IN BLOOD BY AUTOMATED COUNT: 13.3 % (ref 11.5–17)
EST. AVERAGE GLUCOSE BLD GHB EST-MCNC: 131.2 MG/DL
GFR SERPLBLD CREATININE-BSD FMLA CKD-EPI: 36 ML/MIN/1.73/M2
GLOBULIN SER-MCNC: 4.4 GM/DL (ref 2.4–3.5)
GLUCOSE SERPL-MCNC: 179 MG/DL (ref 82–115)
GLUCOSE UR QL STRIP: NEGATIVE
HBA1C MFR BLD: 6.2 %
HCT VFR BLD AUTO: 34.8 % (ref 37–47)
HDLC SERPL-MCNC: 48 MG/DL (ref 35–60)
HGB BLD-MCNC: 11.6 G/DL (ref 12–16)
HGB UR QL STRIP: NEGATIVE
HYALINE CASTS URNS QL MICRO: NORMAL /LPF
IMM GRANULOCYTES # BLD AUTO: 0.01 X10(3)/MCL (ref 0–0.04)
IMM GRANULOCYTES NFR BLD AUTO: 0.2 %
KETONES UR QL STRIP: NEGATIVE
LDLC SERPL CALC-MCNC: 77 MG/DL (ref 50–140)
LEUKOCYTE ESTERASE UR QL STRIP: NEGATIVE
LYMPHOCYTES # BLD AUTO: 1.57 X10(3)/MCL (ref 0.6–4.6)
LYMPHOCYTES NFR BLD AUTO: 28.1 %
MAGNESIUM SERPL-MCNC: 2.7 MG/DL (ref 1.6–2.6)
MCH RBC QN AUTO: 30.4 PG (ref 27–31)
MCHC RBC AUTO-ENTMCNC: 33.3 G/DL (ref 33–36)
MCV RBC AUTO: 91.3 FL (ref 80–94)
MONOCYTES # BLD AUTO: 0.45 X10(3)/MCL (ref 0.1–1.3)
MONOCYTES NFR BLD AUTO: 8.1 %
NEUTROPHILS # BLD AUTO: 3 X10(3)/MCL (ref 2.1–9.2)
NEUTROPHILS NFR BLD AUTO: 53.6 %
NITRITE UR QL STRIP: NEGATIVE
NRBC BLD AUTO-RTO: 0 %
PH UR STRIP: 6 [PH]
PHOSPHATE SERPL-MCNC: 3.8 MG/DL (ref 2.3–4.7)
PLATELET # BLD AUTO: 285 X10(3)/MCL (ref 130–400)
PMV BLD AUTO: 9.9 FL (ref 7.4–10.4)
POTASSIUM SERPL-SCNC: 4.1 MMOL/L (ref 3.5–5.1)
PROT SERPL-MCNC: 7.8 GM/DL (ref 5.8–7.6)
PROT UR QL STRIP: >=300
PROT UR STRIP-MCNC: 372.3 MG/DL
RBC # BLD AUTO: 3.81 X10(6)/MCL (ref 4.2–5.4)
RBC #/AREA URNS AUTO: NORMAL /HPF
SODIUM SERPL-SCNC: 140 MMOL/L (ref 136–145)
SP GR UR STRIP.AUTO: 1.01 (ref 1–1.03)
SQUAMOUS #/AREA URNS AUTO: NORMAL /HPF
TRIGL SERPL-MCNC: 141 MG/DL (ref 37–140)
URATE SERPL-MCNC: 5.9 MG/DL (ref 2.6–6)
URINE PROTEIN/CREATININE RATIO (OLG): 5.8
UROBILINOGEN UR STRIP-ACNC: 0.2
VLDLC SERPL CALC-MCNC: 28 MG/DL
WBC # BLD AUTO: 5.59 X10(3)/MCL (ref 4.5–11.5)
WBC #/AREA URNS AUTO: NORMAL /HPF

## 2025-07-28 PROCEDURE — 80053 COMPREHEN METABOLIC PANEL: CPT

## 2025-07-28 PROCEDURE — 85025 COMPLETE CBC W/AUTO DIFF WBC: CPT

## 2025-07-28 PROCEDURE — 82550 ASSAY OF CK (CPK): CPT

## 2025-07-28 PROCEDURE — 82570 ASSAY OF URINE CREATININE: CPT

## 2025-07-28 PROCEDURE — 84550 ASSAY OF BLOOD/URIC ACID: CPT

## 2025-07-28 PROCEDURE — 83735 ASSAY OF MAGNESIUM: CPT

## 2025-07-28 PROCEDURE — 83036 HEMOGLOBIN GLYCOSYLATED A1C: CPT

## 2025-07-28 PROCEDURE — 36415 COLL VENOUS BLD VENIPUNCTURE: CPT

## 2025-07-28 PROCEDURE — 81003 URINALYSIS AUTO W/O SCOPE: CPT

## 2025-07-28 PROCEDURE — 80061 LIPID PANEL: CPT

## 2025-07-28 PROCEDURE — 84100 ASSAY OF PHOSPHORUS: CPT

## 2025-07-29 ENCOUNTER — TELEPHONE (OUTPATIENT)
Dept: NEPHROLOGY | Facility: CLINIC | Age: 85
End: 2025-07-29
Payer: MEDICARE

## 2025-08-02 ENCOUNTER — HOSPITAL ENCOUNTER (EMERGENCY)
Facility: HOSPITAL | Age: 85
Discharge: HOME | End: 2025-08-02
Attending: EMERGENCY MEDICINE
Payer: MEDICARE

## 2025-08-02 VITALS
OXYGEN SATURATION: 95 % | TEMPERATURE: 98 F | HEART RATE: 60 BPM | SYSTOLIC BLOOD PRESSURE: 161 MMHG | RESPIRATION RATE: 18 BRPM | DIASTOLIC BLOOD PRESSURE: 79 MMHG

## 2025-08-02 DIAGNOSIS — I10 HYPERTENSION, UNSPECIFIED TYPE: Primary | ICD-10-CM

## 2025-08-02 DIAGNOSIS — M25.561 ACUTE PAIN OF RIGHT KNEE: ICD-10-CM

## 2025-08-02 DIAGNOSIS — R52 PAIN: ICD-10-CM

## 2025-08-02 PROCEDURE — 99284 EMERGENCY DEPT VISIT MOD MDM: CPT | Mod: 25

## 2025-08-02 PROCEDURE — 25000003 PHARM REV CODE 250: Performed by: EMERGENCY MEDICINE

## 2025-08-02 RX ORDER — DICLOFENAC SODIUM 10 MG/G
2 GEL TOPICAL DAILY
Status: CANCELLED | OUTPATIENT
Start: 2025-08-02

## 2025-08-02 RX ORDER — ONDANSETRON 4 MG/1
4 TABLET, ORALLY DISINTEGRATING ORAL
Status: COMPLETED | OUTPATIENT
Start: 2025-08-02 | End: 2025-08-02

## 2025-08-02 RX ORDER — CLONIDINE HYDROCHLORIDE 0.1 MG/1
0.1 TABLET ORAL 2 TIMES DAILY
Qty: 30 TABLET | Refills: 1 | Status: SHIPPED | OUTPATIENT
Start: 2025-08-02 | End: 2026-08-02

## 2025-08-02 RX ORDER — HYDROCODONE BITARTRATE AND ACETAMINOPHEN 5; 325 MG/1; MG/1
1 TABLET ORAL EVERY 8 HOURS PRN
Qty: 12 TABLET | Refills: 0 | Status: SHIPPED | OUTPATIENT
Start: 2025-08-02

## 2025-08-02 RX ORDER — HYDROCODONE BITARTRATE AND ACETAMINOPHEN 5; 325 MG/1; MG/1
1 TABLET ORAL
Refills: 0 | Status: COMPLETED | OUTPATIENT
Start: 2025-08-02 | End: 2025-08-02

## 2025-08-02 RX ORDER — CLONIDINE HYDROCHLORIDE 0.1 MG/1
0.1 TABLET ORAL
Status: COMPLETED | OUTPATIENT
Start: 2025-08-02 | End: 2025-08-02

## 2025-08-02 RX ADMIN — HYDROCODONE BITARTRATE AND ACETAMINOPHEN 1 TABLET: 5; 325 TABLET ORAL at 06:08

## 2025-08-02 RX ADMIN — ONDANSETRON 4 MG: 4 TABLET, ORALLY DISINTEGRATING ORAL at 06:08

## 2025-08-02 RX ADMIN — CLONIDINE HYDROCHLORIDE 0.1 MG: 0.1 TABLET ORAL at 06:08

## 2025-08-02 NOTE — ED PROVIDER NOTES
Encounter Date: 8/2/2025       History     Chief Complaint   Patient presents with    Hypertension     HTN with fatigue     Patient is an 86 yo F presenting for hypertension. She is also having some R medial knee pain. She is not having any chest pain, sob, or other complaints. Family was concerned as it was very high despite taking her BP medications as prescribed.     She has been having issues with BP control for the past few weeks. Initially she was on Avapro and amlodipine. She was then started on HCTZ for leg swelling and told to stop the amlodipine. However family stated later they were told to stop the Avapro and the amlodpine but I did not find this in the documentation. When I saw her on 7/16, I restarted her Avapro and instructed her to continue the HCTZ as well. On 7/18 her nephrologist increased her HCTZ to 25mg Qday. On 7/21 family called their family practioner and told them she had restarted her amlodpine but they were instructed to not do that. Currently the patient is taking Avapro 300mg Q day and HCTZ 25 mg Q day.  She took her HCTZ at 7:00 a.m. and then her Avapro at 12:30 p.m..  Family came in case they were concerned as her blood pressure was still very high.  The patient is asymptomatic as far as headache, chest pain, shortness of breath, nausea, back pain.  She is having some right knee pain and she has been using a home remedy cream on it. No falls or trauma.        Review of patient's allergies indicates:   Allergen Reactions    Omeprazole      Past Medical History:   Diagnosis Date    Diabetes mellitus, type 2     H. pylori infection     Hyperlipidemia     Hypertension      Past Surgical History:   Procedure Laterality Date    BACK SURGERY      EYE SURGERY  2024    Catiract     Family History   Problem Relation Name Age of Onset    Cancer Daughter Taisha baez     Cancer Daughter Christie martinez      Social History[1]  Review of Systems   All other systems reviewed and are negative.      Physical  Exam     Initial Vitals [08/02/25 1739]   BP Pulse Resp Temp SpO2   (!) 217/102 88 20 98.3 °F (36.8 °C) 98 %      MAP       --         Physical Exam    Nursing note and vitals reviewed.  Constitutional: She appears well-developed and well-nourished. No distress.   HENT:   Head: Normocephalic and atraumatic.   Neck: Neck supple.   Cardiovascular:  Normal rate, regular rhythm and normal heart sounds.           No murmur heard.  Pulmonary/Chest: Breath sounds normal. No respiratory distress. She has no wheezes. She has no rhonchi.   Musculoskeletal:         General: No edema. Normal range of motion.      Cervical back: Neck supple.      Comments: RLE: Neurovascularly intact. There is focal medial TTP with some mild swelling to the knee joint. No effusion or erythema.      Neurological: She is alert and oriented to person, place, and time.   Skin: Skin is warm and dry.   Psychiatric: She has a normal mood and affect. Thought content normal.         ED Course   Procedures  Labs Reviewed - No data to display       Imaging Results              X-Ray Knee 3 View Right (Final result)  Result time 08/02/25 19:21:23      Final result by Angel Arguello MD (08/02/25 19:21:23)                   Impression:      Right knee degenerative arthritic changes.      Electronically signed by: Angel Arguello  Date:    08/02/2025  Time:    19:21               Narrative:    EXAMINATION:  XR KNEE 3 VIEW RIGHT    CLINICAL HISTORY:  Pain, unspecified    TECHNIQUE:  Three-view    COMPARISON:  None available    FINDINGS:  The right knee degenerative arthritic changes.  There is more advanced involvement of the medial joint compartment with substantial narrowing of the joint space, subchondral sclerosis and osteophytes.  No acute fracture or dislocation.                        Wet Read by Deirdre Gonzalez MD (08/02/25 18:35:04, [facility] - Emergency Dept, Emergency Medicine)    DJD, no acute fractures                                      Medications   cloNIDine tablet 0.1 mg (0.1 mg Oral Given 8/2/25 1807)   HYDROcodone-acetaminophen 5-325 mg per tablet 1 tablet (1 tablet Oral Given 8/2/25 1829)   ondansetron disintegrating tablet 4 mg (4 mg Oral Given 8/2/25 1829)     Medical Decision Making  Differentials considered but not limited to arthritis, occult fracture, meniscus injury; patient has asymptomatic HTN. Family is very anxious about this and this is the patient's second visit for this complaint. She is compliant with her medications. Will give clonidine here and re-evaluate. Patient also given norco for knee pain.     Problems Addressed:  Acute pain of right knee: acute illness or injury  Hypertension, unspecified type: chronic illness or injury    Amount and/or Complexity of Data Reviewed  Radiology: ordered and independent interpretation performed. Decision-making details documented in ED Course.    Risk  Prescription drug management.               ED Course as of 08/02/25 2005   Sat Aug 02, 2025   2003 Patient's blood pressure improved. [WC]   2004 X-Ray Knee 3 View Right  Right knee degenerative arthritic changes. [WC]      ED Course User Index  [WC] Donn Rader MD                               Clinical Impression:  Final diagnoses:  [R52] Pain  [I10] Hypertension, unspecified type (Primary)  [M25.561] Acute pain of right knee          ED Disposition Condition    Discharge Stable          ED Prescriptions       Medication Sig Dispense Start Date End Date Auth. Provider    cloNIDine (CATAPRES) 0.1 MG tablet Take 1 tablet (0.1 mg total) by mouth 2 (two) times daily. 30 tablet 8/2/2025 8/2/2026 Deirdre Gonzalez MD    HYDROcodone-acetaminophen (NORCO) 5-325 mg per tablet Take 1 tablet by mouth every 8 (eight) hours as needed for Pain. 12 tablet 8/2/2025 -- Deirdre Gonzalez MD          Follow-up Information       Follow up With Specialties Details Why Contact Info    Melissa Philip, DO Family Medicine Call  If symptoms  worsen, return to the ED [address]  [facility] [address]  [phone]                     [1]   Social History  Tobacco Use    Smoking status: Never    Smokeless tobacco: Never   Substance Use Topics    Alcohol use: Never    Drug use: Never        Donn Rader MD  08/02/25 2005

## 2025-08-05 ENCOUNTER — OFFICE VISIT (OUTPATIENT)
Dept: NEPHROLOGY | Facility: CLINIC | Age: 85
End: 2025-08-05
Payer: MEDICARE

## 2025-08-05 VITALS
HEIGHT: 61 IN | SYSTOLIC BLOOD PRESSURE: 158 MMHG | WEIGHT: 99.63 LBS | OXYGEN SATURATION: 99 % | TEMPERATURE: 98 F | DIASTOLIC BLOOD PRESSURE: 90 MMHG | BODY MASS INDEX: 18.81 KG/M2 | HEART RATE: 70 BPM | RESPIRATION RATE: 20 BRPM

## 2025-08-05 DIAGNOSIS — I10 PRIMARY HYPERTENSION: ICD-10-CM

## 2025-08-05 DIAGNOSIS — R80.9 TYPE 2 DIABETES MELLITUS WITH PERSISTENT PROTEINURIA: ICD-10-CM

## 2025-08-05 DIAGNOSIS — E11.29 TYPE 2 DIABETES MELLITUS WITH PERSISTENT PROTEINURIA: ICD-10-CM

## 2025-08-05 DIAGNOSIS — N18.32 STAGE 3B CHRONIC KIDNEY DISEASE: Primary | ICD-10-CM

## 2025-08-05 DIAGNOSIS — R80.1 PERSISTENT PROTEINURIA: ICD-10-CM

## 2025-08-05 DIAGNOSIS — E11.65 TYPE 2 DIABETES MELLITUS WITH HYPERGLYCEMIA, WITHOUT LONG-TERM CURRENT USE OF INSULIN: ICD-10-CM

## 2025-08-05 PROCEDURE — 99214 OFFICE O/P EST MOD 30 MIN: CPT | Mod: PBBFAC | Performed by: INTERNAL MEDICINE

## 2025-08-05 PROCEDURE — 99999 PR PBB SHADOW E&M-EST. PATIENT-LVL IV: CPT | Mod: PBBFAC,,, | Performed by: INTERNAL MEDICINE

## 2025-08-05 PROCEDURE — 99215 OFFICE O/P EST HI 40 MIN: CPT | Mod: S$PBB,,, | Performed by: INTERNAL MEDICINE

## 2025-08-05 RX ORDER — FUROSEMIDE 20 MG/1
20 TABLET ORAL 2 TIMES DAILY
Qty: 60 TABLET | Refills: 11 | Status: SHIPPED | OUTPATIENT
Start: 2025-08-05 | End: 2026-08-05

## 2025-08-05 RX ORDER — AMLODIPINE BESYLATE 10 MG/1
10 TABLET ORAL DAILY
Qty: 90 TABLET | Refills: 3 | Status: SHIPPED | OUTPATIENT
Start: 2025-08-05

## 2025-08-05 RX ORDER — DOCUSATE SODIUM 100 MG/1
100 CAPSULE, LIQUID FILLED ORAL 2 TIMES DAILY
Qty: 180 CAPSULE | Refills: 3 | Status: SHIPPED | OUTPATIENT
Start: 2025-08-05

## 2025-08-05 NOTE — ASSESSMENT & PLAN NOTE
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 She has uncontrolled HTN but DM is well-controlled. Cannot rule out other etiology for proteinuria at this time. Serologic workup has been mostly negative thus far (except for inconclusive dsDNA). Will repeat BRENDA and dsDNA.    Again, we discussed risks/benefits/alternatives to renal biopsy considering persistent proteinuria in the nephrotic range. Ordered renal biopsy today for eval significant proteinuria despite well-controlled DM.    Discussed adequate hydration again today  Continue low salt diet  BP control discussed with goal BP <140/90 (due to age)  Continue A1c control with goal A1c <8% (due to age)    Continue max-dose ARB  Holding off on SGLT2i therapy due to concern for UTI

## 2025-08-05 NOTE — ASSESSMENT & PLAN NOTE
Still has nephrotic range proteinuria despite max dose ARB. A1c is at goal but BP is not well controlled.     BP med changes as seen in HTN A/P    Will hold off on SGLT2 inhibitor therapy given history of recurrent UTIs in the past    Again, we addressed renal biopsy risks/benefits/alternatives today. Will proceed with renal biopsy to determine etiology of worsening proteinuria.

## 2025-08-05 NOTE — ASSESSMENT & PLAN NOTE
A1c is well controlled (<7%) for years but she has had DM for >10 yrs  Continue glipizide and dietary control    Would like to add SGLT2 inhibitor for renal protection/proteinuria but will hold off per daughter's preference due to risk for UTI's (pt has had many UTI's in the past)

## 2025-08-05 NOTE — ASSESSMENT & PLAN NOTE
BP is not well controlled, suspect as a result of intra-renal process with nephrotic range proteinuria  Advised to treat pain as directed by ER physician and to follow up with PMD    Given bilateral knee pain is worsening, will change HCTZ to amlodipine 10 mg and furosemide 20 mg daily  Continue irbesartan 300 mg daily  She is not taking doxazosin or clonidine    Continue low-salt diet and increased water intake  Advised to measure BP at home and bring in log book for review at next appointment  Advised to bring in all medications to next appointment for review  Goal BP <140/90 discussed in detail today  Advised to take clonidine 0.1 mg if BP >160/100 despite pain control

## 2025-08-05 NOTE — PATIENT INSTRUCTIONS
Stop hydrochlorothiazide  Start amlodipine 10 mg daily (in morning)  Start furosemide 20 mg daily (in morning)  Continue irbesartan 300 mg daily (at night)  Take clonidine 0.1 mg as needed for high blood pressure > 160/100  Treat pain with hydrocodone-acetaminophen OR acetaminophen only. Do not double up on meds  Take colace for constipation    Avoid NSAIDs (Aleve, Mobic, Celebrex, Ibuprofen, Advil, Toradol and Diclofenac).  Only take tylenol occasionally if needed for aches/pains.    Discuss biopsy with pt and family and proceed if desired as we discussed

## 2025-08-05 NOTE — LETTER
August 5, 2025        Melissa Philip DO  [address]  [facility] [address]             [facility]  [facility]  [place] [address]  Phone: [phone]   Patient: Shilpa Dumont   MR Number: [id number]   YOB: 1940   Date of Visit: 8/5/2025       Dear Dr. Philip:    Thank you for referring Shilpa Dumont to me for evaluation. Below are the relevant portions of my assessment and plan of care.            If you have questions, please do not hesitate to call me. I look forward to following Shilpa along with you.    Sincerely,      Johanne Mckeon,            CC  No Recipients

## 2025-08-05 NOTE — PROGRESS NOTES
Nephrology Clinic    Patient ID: [id number]     Chief Complaint: Follow-up      HPI:     Shilpa Dumont is a 85 y.o. female here today for nephrology clinic office visit.     Daughter, Taisha, provides translation and Hx. Pt is hard-of-hearing.    Since her last visit with me, she has B/L knee pain. No falls or trauma. Has had knee pain in the past but it is worse recently.  BP log from home: 138-171/. Went to the ER over the weekend for knee pain and HTN. Was given clonidine BID but it is not helping.     Patient denies taking NSAIDs or new antibiotics. Also denies recent episode of dehydration, diarrhea, vomiting, acute illness, hospitalization, recent angiograms or exposure to IV radiocontrast.    Per daughter, still has foamy urine, no change in the past month. No other symptoms, fever, chills, dysuria, hematuria, suprapubic or flank pain. Denies leg edema, cough, chest congestion, epistaxis, headaches, dizziness. Pt is not drinking enough water, only 3-4 bottles (16.9 oz). She is not eating much due to poor appetite. Is on a low salt diet.      Past Medical History:   Diagnosis Date    Diabetes mellitus, type 2     H. pylori infection     Hyperlipidemia     Hypertension         Past Surgical History:   Procedure Laterality Date    BACK SURGERY      EYE SURGERY  2024    Catiract        Social History     Tobacco Use    Smoking status: Never    Smokeless tobacco: Never   Substance and Sexual Activity    Alcohol use: Never    Drug use: Never    Sexual activity: Not Currently     Partners: Male     Birth control/protection: None        Current Outpatient Medications   Medication Instructions    amLODIPine (NORVASC) 10 mg, Oral, Daily    cloNIDine (CATAPRES) 0.1 mg, Oral, 2 times daily    docusate sodium (COLACE) 100 mg, Oral, 2 times daily PRN    docusate sodium (COLACE) 100 mg, Oral, 2 times daily    furosemide (LASIX) 20 mg, Oral, 2 times daily    glipiZIDE (GLUCOTROL) 5 mg, Oral, Daily     "HYDROcodone-acetaminophen (NORCO) 5-325 mg per tablet 1 tablet, Oral, Every 8 hours PRN    irbesartan (AVAPRO) 300 mg, Oral, Daily    rosuvastatin (CRESTOR) 20 mg, Oral, Daily       Review of patient's allergies indicates:   Allergen Reactions    Omeprazole         Patient Care Team:  Melissa Philip DO as PCP - General (Family Medicine)  Saul Rodriguez MD as Consulting Physician (Nephrology)  Sanjuana Nuno ACNP as Nurse Practitioner (Nephrology)  Chico Lazo FNP as Nurse Practitioner (Nephrology)  Johanne Mckeon DO as Consulting Physician (Nephrology)     Subjective:     ROS    Negative except as stated in the history of present illness. See HPI for details.    Objective:     Visit Vitals  BP (!) 158/90 (BP Location: Left arm, Patient Position: Sitting)   Pulse 70   Temp 98.2 °F (36.8 °C) (Temporal)   Resp 20   Ht 5' 0.71" (1.542 m)   Wt 45.2 kg (99 lb 9.6 oz)   SpO2 99%   BMI 19.00 kg/m²       Physical Exam  General Appearance: Patient is in no acute distress. Awake. Alert.  Skin: No rashes or wounds.  HEENT: PERRLA, EOMI, no scleral icterus, no JVD. Neck is supple.  Chest: Respirations are unlabored. Lungs sounds are clear.   Heart: S1, S2. Regular rate and rhythm.  Abdomen: Soft, Non-tender, Non-distended.  : No CVA tenderness.   Extremities: peripheral pulses are palpable. No edema.   Neuro: No focal deficits.     Laboratory/Imaging Reviewed:     Blood:  Lab Results   Component Value Date    WBC 5.59 07/28/2025    HGB 11.6 (L) 07/28/2025    HCT 34.8 (L) 07/28/2025     07/28/2025    IRON 62 04/10/2024    TIBC 260 04/10/2024    LABIRON 24 04/10/2024    FERRITIN 251.39 (H) 04/10/2024    FOLATE 14.5 04/10/2024    JVSIGEKO79 862 (H) 04/10/2024     07/28/2025    K 4.1 07/28/2025    CO2 30 07/28/2025    BUN 42.7 (H) 07/28/2025    CREATININE 1.45 (H) 07/28/2025    EGFRNORACEVR 36 07/28/2025    CALCIUM 9.3 07/28/2025    ALKPHOS 100 07/28/2025    ALBUMIN 3.4 07/28/2025    BILIDIR 0.2 " 12/01/2021    IBILI 0.30 12/01/2021    AST 27 07/28/2025    ALT 23 07/28/2025    MG 2.70 (H) 07/28/2025    PHOS 3.8 07/28/2025      Lab Results   Component Value Date    HGBA1C 6.2 07/28/2025    PTH 75.1 07/02/2025    PVZIHEUP48JL 33 07/02/2025    HIV Nonreactive 05/26/2025    MSPIKEPCT  05/26/2025      Comment:      Not observed     Urine:  Lab Results   Component Value Date    APPEARANCEUA Clear 07/28/2025    SGUA 1.015 07/28/2025    PROTEINUA >=300 (A) 07/28/2025    KETONESUA Negative 07/28/2025    LEUKOCYTESUR Negative 07/28/2025    RBCUA None Seen 07/28/2025    WBCUA 0-2 07/28/2025    BACTERIA Rare 07/28/2025    HYALINECASTS Rare 07/28/2025    CREATRANDUR 64.7 07/28/2025    PROTEINURINE 372.3 07/28/2025    UPROTCREA 5.8 07/28/2025      Imaging:  US Retroperitoneal Complete 05/26/2025    Narrative  EXAMINATION:  US RETROPERITONEAL COMPLETE    CLINICAL HISTORY:  Chronic kidney disease, proteinuria    COMPARISON:  None    FINDINGS:  Right kidney measures 9.3 cm. Left kidney measures 9.9 cm.  No stones or hydronephrosis.  Renal echogenicity is increased bilaterally.  There are small anechoic cysts on both kidneys.    Urinary bladder is partially decompressed.    Impression  Echogenic kidneys suggestive of renal parenchymal disease      Electronically signed by: Mendoza Schuler MD  Date:    05/26/2025  Time:    16:16      CT Abdomen Pelvis With Contrast 08/03/2022    Narrative  EXAMINATION:  CT ABDOMEN PELVIS WITH CONTRAST    CLINICAL HISTORY:  Abdominal abscess/infection suspected;    TECHNIQUE:  Helically acquired images with axial, sagittal and coronal reformations were obtained from the lung bases to the pubic symphysis after the IV administration of contrast.    Automated tube current modulation, weight-based exposure dosing, and/or iterative reconstruction technique utilized to reach lowest reasonably achievable exposure rate.    DLP: 166 mGy*cm    COMPARISON:  Report from CT abdomen pelvis dated  12/01/2021    FINDINGS:  LIMITATIONS: Motion degraded exam.    HEART: Cardiomegaly.    LUNG BASES: Trace pleural fluid bilaterally.    LIVER: Normal attenuation. No appreciable focal hepatic lesion.    BILIARY: No calcified gallstones.    PANCREAS: No inflammatory change.    SPLEEN: Normal in size    ADRENALS: No mass.    KIDNEYS/URETERS: The kidneys enhance symmetrically.  No hydronephrosis.    GI TRACT/MESENTERY: Stomach has a normal CT appearance.  No evidence of bowel obstruction or inflammation. The appendix is normal.    PERITONEUM: No free fluid.No free air.    LYMPH NODES: No enlarged lymph nodes by size criteria.    VASCULATURE: Aortoiliac atherosclerosis.    BLADDER: Normal appearance given degree of distention.    REPRODUCTIVE ORGANS: Normal as visualized.    SOFT TISSUES: Unremarkable.    BONES: Lumbar spondylosis.    Impression  1. No appreciable acute intra-abdominal abnormality  2. Cardiomegaly      Electronically signed by: Martha Rodriguez  Date:    08/03/2022  Time:    10:22      CT Abdomen Pelvis With Contrast 12/01/2021    [facility]  CT Abdomen and Pelvis W Contrast    REASON FOR STUDY: Abdominal Pain    TECHNIQUE: CT imaging was performed of the abdomen and pelvis after  the administration of intravenous contrast. Dose length product is 201  mGycm. Automatic exposure control, adjustment of mA/kV or iterative  reconstruction technique was used to limit radiation dose.    COMPARISON: None    FINDINGS:    Liver: Normal.    Gallbladder and biliary tree: No calcified gallstones. No intra or  extrahepatic biliary ductal dilation.    Pancreas: Normal.    Spleen: Normal.    Adrenals: Normal.    Kidneys and ureters: Small left renal cyst. No hydronephrosis.    Bladder: Normal.    Reproductive organs: No pelvic masses.    Stomach/bowel: There is diffuse wall thickening of the descending  colon with surrounding inflammatory changes. There is no bowel  obstruction.    Lymph nodes: No pathologically enlarged  lymph node identified.    Peritoneum: There is no organized fluid collection or free air.    Vessels: Moderate scattered vascular calcifications without aneurysm.    Abdominal wall: Normal.    Lung bases: No consolidation or pleural effusion.    Bones: Mild degenerative changes of the spine.    IMPRESSION:  Diffuse wall thickening of the descending colon with surrounding  inflammatory changes concerning for a nonspecific colitis. No  organized fluid collection or free air.    Electronically Signed By: Samina Hodge MD  Date/Time Signed: 12/01/2021 14:41      All pertinent nephrology labwork and imaging independently reviewed. Discussed these findings in detail with the patient.    Assessment:       ICD-10-CM ICD-9-CM   1. Stage 3b chronic kidney disease  N18.32 585.3   2. Persistent proteinuria  R80.1 791.0   3. Type 2 diabetes mellitus with hyperglycemia, without long-term current use of insulin  E11.65 250.00     790.29   4. Primary hypertension  I10 401.9   5. Type 2 diabetes mellitus with persistent proteinuria  E11.29 250.40    R80.9 791.0        Plan:     1. 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 She has uncontrolled HTN but DM is well-controlled. Cannot rule out other etiology for proteinuria at this time. Serologic workup has been mostly negative thus far (except for inconclusive dsDNA). Will repeat BRENDA and dsDNA.    Again, we discussed risks/benefits/alternatives to renal biopsy considering persistent proteinuria in the nephrotic range. Ordered renal biopsy today for eval significant proteinuria despite well-controlled DM.    Discussed adequate hydration again today  Continue low salt diet  BP control discussed with goal BP <140/90 (due to age)  Continue A1c control with goal A1c <8% (due to age)    Continue max-dose  ARB  Holding off on SGLT2i therapy due to concern for UTI     Orders:  -     IR Biopsy Kidney; Future; Expected date: 08/05/2025  -     Comprehensive Metabolic Panel; Future; Expected date: 09/01/2025  -     CBC Auto Differential; Future; Expected date: 09/01/2025  -     Urinalysis; Future; Expected date: 09/01/2025  -     Protein/Creatinine Ratio, Urine; Future; Expected date: 09/01/2025  -     Magnesium; Future; Expected date: 09/01/2025  -     Phosphorus; Future; Expected date: 09/01/2025  -     Uric Acid; Future; Expected date: 09/01/2025  -     DSDNA; Future; Expected date: 09/01/2025  -     Ab to Extractable Nuclear Ag Eval,S; Future; Expected date: 09/01/2025  -     Antinuclear Antibodies Direct; Future; Expected date: 09/01/2025  -     Protime-INR; Future; Expected date: 08/11/2025  -     APTT; Future; Expected date: 08/11/2025    2. Persistent proteinuria  Assessment & Plan:  Still has nephrotic range proteinuria despite max dose ARB. A1c is at goal but BP is not well controlled.     BP med changes as seen in HTN A/P    Will hold off on SGLT2 inhibitor therapy given history of recurrent UTIs in the past    Again, we addressed renal biopsy risks/benefits/alternatives today. Will proceed with renal biopsy to determine etiology of worsening proteinuria.    Orders:  -     furosemide (LASIX) 20 MG tablet; Take 1 tablet (20 mg total) by mouth 2 (two) times daily.  Dispense: 60 tablet; Refill: 11  -     IR Biopsy Kidney; Future; Expected date: 08/05/2025  -     DSDNA; Future; Expected date: 09/01/2025  -     Ab to Extractable Nuclear Ag Eval,S; Future; Expected date: 09/01/2025  -     Antinuclear Antibodies Direct; Future; Expected date: 09/01/2025  -     Protime-INR; Future; Expected date: 08/11/2025  -     APTT; Future; Expected date: 08/11/2025    3. Type 2 diabetes mellitus with hyperglycemia, without long-term current use of insulin  Assessment & Plan:  A1c is well controlled (<7%) for years but she has had  DM for >10 yrs  Continue glipizide and dietary control    Would like to add SGLT2 inhibitor for renal protection/proteinuria but will hold off per daughter's preference due to risk for UTI's (pt has had many UTI's in the past)        4. Primary hypertension  Assessment & Plan:  BP is not well controlled, suspect as a result of intra-renal process with nephrotic range proteinuria  Advised to treat pain as directed by ER physician and to follow up with PMD    Given bilateral knee pain is worsening, will change HCTZ to amlodipine 10 mg and furosemide 20 mg daily  Continue irbesartan 300 mg daily  She is not taking doxazosin or clonidine    Continue low-salt diet and increased water intake  Advised to measure BP at home and bring in log book for review at next appointment  Advised to bring in all medications to next appointment for review  Goal BP <140/90 discussed in detail today  Advised to take clonidine 0.1 mg if BP >160/100 despite pain control    Orders:  -     amLODIPine (NORVASC) 10 MG tablet; Take 1 tablet (10 mg total) by mouth once daily.  Dispense: 90 tablet; Refill: 3  -     furosemide (LASIX) 20 MG tablet; Take 1 tablet (20 mg total) by mouth 2 (two) times daily.  Dispense: 60 tablet; Refill: 11    5. Type 2 diabetes mellitus with persistent proteinuria  -     Protime-INR; Future; Expected date: 08/11/2025  -     APTT; Future; Expected date: 08/11/2025    Other orders  -     docusate sodium (COLACE) 100 MG capsule; Take 1 capsule (100 mg total) by mouth 2 (two) times daily.  Dispense: 180 capsule; Refill: 3        General renoprotective measures reviewed and discussed.  Avoid NSAIDs (Aleve, Mobic, Celebrex, Ibuprofen, Advil, Toradol and Diclofenac).  Only take tylenol occasionally if needed for aches/pains.    Educated on low sodium diet:  Avoid high salt foods (olives, pickles, smoked meats, deli meats, salted potato chips, etc.).   Do not add salt to your food at the table.   Use only small amounts of  salt/cajun seasoning when cooking.    Recommended Daily Protein Intake for chronic kidney disease:  0.8 g/kg       Follow up in about 4 weeks (around 9/2/2025) for Blood Pressure Check, With Labwork Prior to Visit. In addition to their scheduled follow up, the patient has also been instructed to follow up on as needed basis.

## 2025-08-08 ENCOUNTER — TELEPHONE (OUTPATIENT)
Dept: FAMILY MEDICINE | Facility: CLINIC | Age: 85
End: 2025-08-08
Payer: MEDICARE

## 2025-08-08 NOTE — TELEPHONE ENCOUNTER
Taisha requested [facility] refill for pt.   Advised Taisha Philip does not refill pain meds.   Pt would need visit for evaluation.  Taisha verbalized understanding.

## 2025-08-08 NOTE — TELEPHONE ENCOUNTER
Copied from CRM #[id number]. Topic: General Inquiry - Patient Advice  >> Aug 8, 2025 10:42 AM Yane wrote:  .Who Called: Taisha    Caller is requesting assistance/information from provider's office.    Symptoms (please be specific): Knee pain  How long has patient had these symptoms:  na  List of preferred pharmacies on file (remove unneeded): [unfilled]  If different, enter pharmacy into here including location and phone number: na      Preferred Method of Contact: Phone Call  Patient's Preferred Phone Number on File: [phone]   Best Call Back Number, if different:  Additional Information: pt's daughter-Taisha Wants nurse to give her a call regarding getting pain medication for her mother

## 2025-08-18 ENCOUNTER — TELEPHONE (OUTPATIENT)
Dept: NEPHROLOGY | Facility: CLINIC | Age: 85
End: 2025-08-18
Payer: MEDICARE

## 2025-08-21 DIAGNOSIS — E11.65 TYPE 2 DIABETES MELLITUS WITH HYPERGLYCEMIA, WITHOUT LONG-TERM CURRENT USE OF INSULIN: ICD-10-CM

## 2025-08-21 RX ORDER — GLIPIZIDE 5 MG/1
5 TABLET ORAL DAILY
Qty: 90 TABLET | Refills: 3 | Status: SHIPPED | OUTPATIENT
Start: 2025-08-21

## 2025-08-23 DIAGNOSIS — E78.2 MIXED HYPERLIPIDEMIA: ICD-10-CM

## 2025-08-25 ENCOUNTER — TELEPHONE (OUTPATIENT)
Dept: NEPHROLOGY | Facility: CLINIC | Age: 85
End: 2025-08-25
Payer: MEDICARE

## 2025-08-25 ENCOUNTER — LAB VISIT (OUTPATIENT)
Dept: LAB | Facility: HOSPITAL | Age: 85
End: 2025-08-25
Attending: INTERNAL MEDICINE
Payer: MEDICARE

## 2025-08-25 DIAGNOSIS — E11.29 TYPE 2 DIABETES MELLITUS WITH PERSISTENT PROTEINURIA: ICD-10-CM

## 2025-08-25 DIAGNOSIS — R80.1 PERSISTENT PROTEINURIA: ICD-10-CM

## 2025-08-25 DIAGNOSIS — R80.9 TYPE 2 DIABETES MELLITUS WITH PERSISTENT PROTEINURIA: ICD-10-CM

## 2025-08-25 DIAGNOSIS — N18.32 STAGE 3B CHRONIC KIDNEY DISEASE: ICD-10-CM

## 2025-08-25 LAB
ALBUMIN SERPL-MCNC: 3.6 G/DL (ref 3.4–4.8)
ALBUMIN/GLOB SERPL: 0.8 RATIO (ref 1.1–2)
ALP SERPL-CCNC: 101 UNIT/L (ref 40–150)
ALT SERPL-CCNC: 34 UNIT/L (ref 0–55)
ANION GAP SERPL CALC-SCNC: 11 MEQ/L
APTT PPP: 29.2 SECONDS (ref 23.4–33.9)
AST SERPL-CCNC: 34 UNIT/L (ref 11–45)
BACTERIA #/AREA URNS AUTO: NORMAL /HPF
BASOPHILS # BLD AUTO: 0.05 X10(3)/MCL
BASOPHILS NFR BLD AUTO: 0.9 %
BILIRUB SERPL-MCNC: 0.5 MG/DL
BILIRUB UR QL STRIP.AUTO: NEGATIVE
BUN SERPL-MCNC: 30.4 MG/DL (ref 9.8–20.1)
CALCIUM SERPL-MCNC: 9.4 MG/DL (ref 8.4–10.2)
CENTROMERE QUANT (OHS): <0.4 U/ML
CHLORIDE SERPL-SCNC: 90 MMOL/L (ref 98–107)
CLARITY UR: CLEAR
CO2 SERPL-SCNC: 29 MMOL/L (ref 23–31)
COLOR UR AUTO: ABNORMAL
CREAT SERPL-MCNC: 1.52 MG/DL (ref 0.55–1.02)
CREAT UR-MCNC: 32.6 MG/DL (ref 45–106)
CREAT/UREA NIT SERPL: 20
DSDNA AB QUANT (OHS): 11 IU/ML
EOSINOPHIL # BLD AUTO: 0.36 X10(3)/MCL (ref 0–0.9)
EOSINOPHIL NFR BLD AUTO: 6.5 %
ERYTHROCYTE [DISTWIDTH] IN BLOOD BY AUTOMATED COUNT: 12.6 % (ref 11.5–17)
GFR SERPLBLD CREATININE-BSD FMLA CKD-EPI: 34 ML/MIN/1.73/M2
GLOBULIN SER-MCNC: 4.3 GM/DL (ref 2.4–3.5)
GLUCOSE SERPL-MCNC: 189 MG/DL (ref 82–115)
GLUCOSE UR QL STRIP: NEGATIVE
HCT VFR BLD AUTO: 36 % (ref 37–47)
HGB BLD-MCNC: 12.6 G/DL (ref 12–16)
HGB UR QL STRIP: NEGATIVE
IMM GRANULOCYTES # BLD AUTO: 0.01 X10(3)/MCL (ref 0–0.04)
IMM GRANULOCYTES NFR BLD AUTO: 0.2 %
INR PPP: 1
JO-1 AB QUANT (OHS): <0.3 U/ML
KETONES UR QL STRIP: NEGATIVE
LEUKOCYTE ESTERASE UR QL STRIP: NEGATIVE
LYMPHOCYTES # BLD AUTO: 1.47 X10(3)/MCL (ref 0.6–4.6)
LYMPHOCYTES NFR BLD AUTO: 26.7 %
MAGNESIUM SERPL-MCNC: 2.3 MG/DL (ref 1.6–2.6)
MCH RBC QN AUTO: 31 PG (ref 27–31)
MCHC RBC AUTO-ENTMCNC: 35 G/DL (ref 33–36)
MCV RBC AUTO: 88.5 FL (ref 80–94)
MONOCYTES # BLD AUTO: 0.49 X10(3)/MCL (ref 0.1–1.3)
MONOCYTES NFR BLD AUTO: 8.9 %
NEUTROPHILS # BLD AUTO: 3.12 X10(3)/MCL (ref 2.1–9.2)
NEUTROPHILS NFR BLD AUTO: 56.8 %
NITRITE UR QL STRIP: NEGATIVE
NRBC BLD AUTO-RTO: 0 %
PH UR STRIP: 6 [PH]
PHOSPHATE SERPL-MCNC: 3.8 MG/DL (ref 2.3–4.7)
PLATELET # BLD AUTO: 317 X10(3)/MCL (ref 130–400)
PMV BLD AUTO: 9.1 FL (ref 7.4–10.4)
POTASSIUM SERPL-SCNC: 4 MMOL/L (ref 3.5–5.1)
PROT SERPL-MCNC: 7.9 GM/DL (ref 5.8–7.6)
PROT UR QL STRIP: 100
PROT UR STRIP-MCNC: 145.8 MG/DL
PROTHROMBIN TIME: 13.4 SECONDS (ref 11.7–14.5)
RBC # BLD AUTO: 4.07 X10(6)/MCL (ref 4.2–5.4)
RBC #/AREA URNS AUTO: NORMAL /HPF
RNP70 AB QUANT (OHS): 0.6 U/ML
SCL-70S AB QUANT (OHS): 0.9 U/ML
SMITH AB QUANT (OHS): <0.7 U/ML
SODIUM SERPL-SCNC: 130 MMOL/L (ref 136–145)
SP GR UR STRIP.AUTO: 1.01 (ref 1–1.03)
SQUAMOUS #/AREA URNS AUTO: NORMAL /HPF
SSA(RO) AB QUANT (OHS): <0.4 U/ML
SSB(LA) AB QUANT (OHS): <0.4 U/ML
U1RNP AB QUANT (OHS): 0.9 U/ML
URATE SERPL-MCNC: 6.4 MG/DL (ref 2.6–6)
URINE PROTEIN/CREATININE RATIO (OLG): 4.5
UROBILINOGEN UR STRIP-ACNC: 0.2
WBC # BLD AUTO: 5.5 X10(3)/MCL (ref 4.5–11.5)
WBC #/AREA URNS AUTO: NORMAL /HPF

## 2025-08-25 PROCEDURE — 84100 ASSAY OF PHOSPHORUS: CPT

## 2025-08-25 PROCEDURE — 83735 ASSAY OF MAGNESIUM: CPT

## 2025-08-25 PROCEDURE — 81003 URINALYSIS AUTO W/O SCOPE: CPT

## 2025-08-25 PROCEDURE — 84550 ASSAY OF BLOOD/URIC ACID: CPT

## 2025-08-25 PROCEDURE — 82570 ASSAY OF URINE CREATININE: CPT

## 2025-08-25 PROCEDURE — 80053 COMPREHEN METABOLIC PANEL: CPT

## 2025-08-25 PROCEDURE — 86225 DNA ANTIBODY NATIVE: CPT

## 2025-08-25 PROCEDURE — 85730 THROMBOPLASTIN TIME PARTIAL: CPT

## 2025-08-25 PROCEDURE — 86235 NUCLEAR ANTIGEN ANTIBODY: CPT

## 2025-08-25 PROCEDURE — 36415 COLL VENOUS BLD VENIPUNCTURE: CPT

## 2025-08-25 PROCEDURE — 85610 PROTHROMBIN TIME: CPT

## 2025-08-25 PROCEDURE — 85025 COMPLETE CBC W/AUTO DIFF WBC: CPT

## 2025-08-25 PROCEDURE — 86235 NUCLEAR ANTIGEN ANTIBODY: CPT | Mod: 59

## 2025-08-25 RX ORDER — ROSUVASTATIN CALCIUM 20 MG/1
20 TABLET, COATED ORAL DAILY
Qty: 90 TABLET | Refills: 3 | Status: SHIPPED | OUTPATIENT
Start: 2025-08-25

## 2025-09-02 ENCOUNTER — OFFICE VISIT (OUTPATIENT)
Dept: NEPHROLOGY | Facility: CLINIC | Age: 85
End: 2025-09-02
Payer: MEDICARE

## 2025-09-02 VITALS
HEART RATE: 87 BPM | WEIGHT: 94 LBS | HEIGHT: 61 IN | SYSTOLIC BLOOD PRESSURE: 129 MMHG | OXYGEN SATURATION: 98 % | TEMPERATURE: 98 F | RESPIRATION RATE: 19 BRPM | DIASTOLIC BLOOD PRESSURE: 76 MMHG | BODY MASS INDEX: 17.75 KG/M2

## 2025-09-02 DIAGNOSIS — R80.1 PERSISTENT PROTEINURIA: ICD-10-CM

## 2025-09-02 DIAGNOSIS — N18.32 TYPE 2 DIABETES MELLITUS WITH STAGE 3B CHRONIC KIDNEY DISEASE, WITHOUT LONG-TERM CURRENT USE OF INSULIN: ICD-10-CM

## 2025-09-02 DIAGNOSIS — I10 PRIMARY HYPERTENSION: ICD-10-CM

## 2025-09-02 DIAGNOSIS — E87.1 HYPONATREMIA: ICD-10-CM

## 2025-09-02 DIAGNOSIS — E11.22 TYPE 2 DIABETES MELLITUS WITH STAGE 3B CHRONIC KIDNEY DISEASE, WITHOUT LONG-TERM CURRENT USE OF INSULIN: ICD-10-CM

## 2025-09-02 DIAGNOSIS — N18.32 STAGE 3B CHRONIC KIDNEY DISEASE: Primary | ICD-10-CM

## 2025-09-02 PROCEDURE — 99999 PR PBB SHADOW E&M-EST. PATIENT-LVL IV: CPT | Mod: PBBFAC,,, | Performed by: INTERNAL MEDICINE

## 2025-09-02 PROCEDURE — 99215 OFFICE O/P EST HI 40 MIN: CPT | Mod: S$PBB,,, | Performed by: INTERNAL MEDICINE

## 2025-09-02 PROCEDURE — 99214 OFFICE O/P EST MOD 30 MIN: CPT | Mod: PBBFAC | Performed by: INTERNAL MEDICINE
